# Patient Record
Sex: MALE | Race: WHITE | Employment: OTHER | URBAN - METROPOLITAN AREA
[De-identification: names, ages, dates, MRNs, and addresses within clinical notes are randomized per-mention and may not be internally consistent; named-entity substitution may affect disease eponyms.]

---

## 2022-01-25 ENCOUNTER — HOSPITAL ENCOUNTER (OUTPATIENT)
Dept: PULMONOLOGY | Age: 72
Setting detail: THERAPIES SERIES
Discharge: HOME | End: 2022-01-25
Payer: MEDICARE

## 2022-01-25 PROCEDURE — 94625 PHY/QHP OP PULM RHB W/O MNTR: CPT

## 2022-01-25 PROCEDURE — 94626 PHY/QHP OP PULM RHB W/MNTR: CPT

## 2022-01-27 ENCOUNTER — HOSPITAL ENCOUNTER (OUTPATIENT)
Dept: PULMONOLOGY | Age: 72
Setting detail: THERAPIES SERIES
Discharge: HOME | End: 2022-01-27
Payer: MEDICARE

## 2022-01-27 PROCEDURE — 94626 PHY/QHP OP PULM RHB W/MNTR: CPT

## 2022-01-27 PROCEDURE — 94625 PHY/QHP OP PULM RHB W/O MNTR: CPT

## 2022-02-01 ENCOUNTER — HOSPITAL ENCOUNTER (OUTPATIENT)
Dept: PULMONOLOGY | Age: 72
Setting detail: THERAPIES SERIES
Discharge: HOME | End: 2022-02-01
Payer: MEDICARE

## 2022-02-01 PROCEDURE — 94625 PHY/QHP OP PULM RHB W/O MNTR: CPT

## 2022-02-03 ENCOUNTER — APPOINTMENT (OUTPATIENT)
Dept: PULMONOLOGY | Age: 72
End: 2022-02-03
Payer: MEDICARE

## 2022-02-08 ENCOUNTER — HOSPITAL ENCOUNTER (OUTPATIENT)
Dept: PULMONOLOGY | Age: 72
Setting detail: THERAPIES SERIES
Discharge: HOME | End: 2022-02-08
Payer: MEDICARE

## 2022-02-08 PROCEDURE — 94625 PHY/QHP OP PULM RHB W/O MNTR: CPT

## 2022-02-10 ENCOUNTER — HOSPITAL ENCOUNTER (OUTPATIENT)
Dept: PULMONOLOGY | Age: 72
Setting detail: THERAPIES SERIES
Discharge: HOME | End: 2022-02-10
Payer: MEDICARE

## 2022-02-10 PROCEDURE — 94625 PHY/QHP OP PULM RHB W/O MNTR: CPT

## 2022-02-15 ENCOUNTER — HOSPITAL ENCOUNTER (OUTPATIENT)
Dept: PULMONOLOGY | Age: 72
Setting detail: THERAPIES SERIES
Discharge: HOME | End: 2022-02-15
Payer: MEDICARE

## 2022-02-15 PROCEDURE — 94625 PHY/QHP OP PULM RHB W/O MNTR: CPT

## 2022-02-17 ENCOUNTER — HOSPITAL ENCOUNTER (OUTPATIENT)
Dept: PULMONOLOGY | Age: 72
Setting detail: THERAPIES SERIES
Discharge: HOME | End: 2022-02-17
Payer: MEDICARE

## 2022-02-17 PROCEDURE — 94625 PHY/QHP OP PULM RHB W/O MNTR: CPT

## 2022-02-22 ENCOUNTER — HOSPITAL ENCOUNTER (OUTPATIENT)
Dept: PULMONOLOGY | Age: 72
Setting detail: THERAPIES SERIES
Discharge: HOME | End: 2022-02-22
Payer: MEDICARE

## 2022-02-22 PROCEDURE — 94625 PHY/QHP OP PULM RHB W/O MNTR: CPT

## 2022-02-24 ENCOUNTER — HOSPITAL ENCOUNTER (OUTPATIENT)
Dept: PULMONOLOGY | Age: 72
Setting detail: THERAPIES SERIES
Discharge: HOME | End: 2022-02-24
Payer: MEDICARE

## 2022-02-24 PROCEDURE — 94625 PHY/QHP OP PULM RHB W/O MNTR: CPT

## 2022-03-01 ENCOUNTER — HOSPITAL ENCOUNTER (OUTPATIENT)
Dept: PULMONOLOGY | Age: 72
Setting detail: THERAPIES SERIES
Discharge: HOME | End: 2022-03-01
Payer: MEDICARE

## 2022-03-01 PROCEDURE — 94625 PHY/QHP OP PULM RHB W/O MNTR: CPT

## 2022-03-03 ENCOUNTER — HOSPITAL ENCOUNTER (OUTPATIENT)
Dept: PULMONOLOGY | Age: 72
Discharge: HOME | End: 2022-03-03

## 2022-03-03 PROCEDURE — 94625 PHY/QHP OP PULM RHB W/O MNTR: CPT

## 2022-03-08 ENCOUNTER — HOSPITAL ENCOUNTER (OUTPATIENT)
Dept: PULMONOLOGY | Age: 72
Setting detail: THERAPIES SERIES
Discharge: HOME | End: 2022-03-08
Payer: MEDICARE

## 2022-03-08 PROCEDURE — 94625 PHY/QHP OP PULM RHB W/O MNTR: CPT

## 2022-03-10 ENCOUNTER — HOSPITAL ENCOUNTER (OUTPATIENT)
Dept: PULMONOLOGY | Age: 72
Setting detail: THERAPIES SERIES
Discharge: HOME | End: 2022-03-10
Payer: MEDICARE

## 2022-03-10 PROCEDURE — 94625 PHY/QHP OP PULM RHB W/O MNTR: CPT

## 2022-03-15 ENCOUNTER — HOSPITAL ENCOUNTER (OUTPATIENT)
Dept: PULMONOLOGY | Age: 72
Setting detail: THERAPIES SERIES
Discharge: HOME | End: 2022-03-15
Payer: MEDICARE

## 2022-03-15 PROCEDURE — 94625 PHY/QHP OP PULM RHB W/O MNTR: CPT

## 2022-03-17 ENCOUNTER — HOSPITAL ENCOUNTER (OUTPATIENT)
Dept: PULMONOLOGY | Age: 72
Setting detail: THERAPIES SERIES
Discharge: HOME | End: 2022-03-17
Payer: MEDICARE

## 2022-03-17 PROCEDURE — 94625 PHY/QHP OP PULM RHB W/O MNTR: CPT

## 2022-03-22 ENCOUNTER — HOSPITAL ENCOUNTER (OUTPATIENT)
Dept: PULMONOLOGY | Age: 72
Setting detail: THERAPIES SERIES
Discharge: HOME | End: 2022-03-22
Payer: MEDICARE

## 2022-03-22 PROCEDURE — 94625 PHY/QHP OP PULM RHB W/O MNTR: CPT

## 2022-03-24 ENCOUNTER — HOSPITAL ENCOUNTER (OUTPATIENT)
Dept: PULMONOLOGY | Age: 72
Setting detail: THERAPIES SERIES
Discharge: HOME | End: 2022-03-24
Payer: MEDICARE

## 2022-03-24 PROCEDURE — 94625 PHY/QHP OP PULM RHB W/O MNTR: CPT

## 2022-03-29 ENCOUNTER — HOSPITAL ENCOUNTER (OUTPATIENT)
Dept: PULMONOLOGY | Age: 72
Setting detail: THERAPIES SERIES
Discharge: HOME | End: 2022-03-29
Payer: MEDICARE

## 2022-03-29 PROCEDURE — 94625 PHY/QHP OP PULM RHB W/O MNTR: CPT

## 2022-03-31 ENCOUNTER — HOSPITAL ENCOUNTER (OUTPATIENT)
Dept: PULMONOLOGY | Age: 72
Setting detail: THERAPIES SERIES
Discharge: HOME | End: 2022-03-31
Payer: MEDICARE

## 2022-03-31 PROCEDURE — 94625 PHY/QHP OP PULM RHB W/O MNTR: CPT

## 2022-04-05 ENCOUNTER — HOSPITAL ENCOUNTER (OUTPATIENT)
Dept: PULMONOLOGY | Age: 72
Setting detail: THERAPIES SERIES
Discharge: HOME | End: 2022-04-05
Payer: MEDICARE

## 2022-04-05 PROCEDURE — 94625 PHY/QHP OP PULM RHB W/O MNTR: CPT

## 2022-04-07 ENCOUNTER — HOSPITAL ENCOUNTER (OUTPATIENT)
Dept: PULMONOLOGY | Age: 72
Setting detail: THERAPIES SERIES
Discharge: HOME | End: 2022-04-07
Payer: MEDICARE

## 2022-04-07 PROCEDURE — 94625 PHY/QHP OP PULM RHB W/O MNTR: CPT

## 2022-04-12 ENCOUNTER — HOSPITAL ENCOUNTER (OUTPATIENT)
Dept: PULMONOLOGY | Age: 72
Setting detail: THERAPIES SERIES
Discharge: HOME | End: 2022-04-12
Payer: MEDICARE

## 2022-04-12 PROCEDURE — 94625 PHY/QHP OP PULM RHB W/O MNTR: CPT

## 2022-04-14 ENCOUNTER — HOSPITAL ENCOUNTER (OUTPATIENT)
Dept: PULMONOLOGY | Age: 72
Setting detail: THERAPIES SERIES
Discharge: HOME | End: 2022-04-14
Payer: MEDICARE

## 2022-04-14 PROCEDURE — 94625 PHY/QHP OP PULM RHB W/O MNTR: CPT

## 2022-04-19 ENCOUNTER — HOSPITAL ENCOUNTER (OUTPATIENT)
Dept: PULMONOLOGY | Age: 72
Setting detail: THERAPIES SERIES
Discharge: HOME | End: 2022-04-19
Payer: MEDICARE

## 2022-04-19 PROCEDURE — 94625 PHY/QHP OP PULM RHB W/O MNTR: CPT

## 2022-04-21 ENCOUNTER — HOSPITAL ENCOUNTER (OUTPATIENT)
Dept: PULMONOLOGY | Age: 72
Setting detail: THERAPIES SERIES
Discharge: HOME | End: 2022-04-21
Payer: MEDICARE

## 2022-04-21 PROCEDURE — 94625 PHY/QHP OP PULM RHB W/O MNTR: CPT

## 2022-04-26 ENCOUNTER — HOSPITAL ENCOUNTER (OUTPATIENT)
Dept: PULMONOLOGY | Age: 72
Setting detail: THERAPIES SERIES
Discharge: HOME | End: 2022-04-26
Payer: MEDICARE

## 2022-04-26 PROCEDURE — 94625 PHY/QHP OP PULM RHB W/O MNTR: CPT

## 2022-04-28 ENCOUNTER — HOSPITAL ENCOUNTER (OUTPATIENT)
Dept: PULMONOLOGY | Age: 72
Setting detail: THERAPIES SERIES
Discharge: HOME | End: 2022-04-28
Payer: MEDICARE

## 2022-04-28 PROCEDURE — 94625 PHY/QHP OP PULM RHB W/O MNTR: CPT

## 2022-05-03 ENCOUNTER — HOSPITAL ENCOUNTER (OUTPATIENT)
Dept: PULMONOLOGY | Age: 72
Setting detail: THERAPIES SERIES
Discharge: HOME | End: 2022-05-03
Payer: MEDICARE

## 2022-05-03 PROCEDURE — 94625 PHY/QHP OP PULM RHB W/O MNTR: CPT

## 2022-05-05 ENCOUNTER — HOSPITAL ENCOUNTER (OUTPATIENT)
Dept: PULMONOLOGY | Age: 72
Setting detail: THERAPIES SERIES
Discharge: HOME | End: 2022-05-05
Payer: MEDICARE

## 2022-05-05 PROCEDURE — 94625 PHY/QHP OP PULM RHB W/O MNTR: CPT

## 2022-05-10 ENCOUNTER — HOSPITAL ENCOUNTER (OUTPATIENT)
Dept: PULMONOLOGY | Age: 72
Setting detail: THERAPIES SERIES
Discharge: HOME | End: 2022-05-10
Payer: MEDICARE

## 2022-05-10 PROCEDURE — 94625 PHY/QHP OP PULM RHB W/O MNTR: CPT

## 2022-05-12 ENCOUNTER — HOSPITAL ENCOUNTER (OUTPATIENT)
Dept: PULMONOLOGY | Age: 72
Setting detail: THERAPIES SERIES
Discharge: HOME | End: 2022-05-12
Payer: MEDICARE

## 2022-05-12 PROCEDURE — 94625 PHY/QHP OP PULM RHB W/O MNTR: CPT

## 2022-05-17 ENCOUNTER — HOSPITAL ENCOUNTER (OUTPATIENT)
Dept: PULMONOLOGY | Age: 72
Setting detail: THERAPIES SERIES
Discharge: HOME | End: 2022-05-17
Payer: MEDICARE

## 2022-05-17 PROCEDURE — 94625 PHY/QHP OP PULM RHB W/O MNTR: CPT

## 2022-05-19 ENCOUNTER — HOSPITAL ENCOUNTER (OUTPATIENT)
Dept: PULMONOLOGY | Age: 72
Setting detail: THERAPIES SERIES
Discharge: HOME | End: 2022-05-19
Payer: MEDICARE

## 2022-05-19 PROCEDURE — 94625 PHY/QHP OP PULM RHB W/O MNTR: CPT

## 2022-05-24 ENCOUNTER — APPOINTMENT (OUTPATIENT)
Dept: PULMONOLOGY | Age: 72
End: 2022-05-24
Payer: MEDICARE

## 2022-05-26 ENCOUNTER — HOSPITAL ENCOUNTER (OUTPATIENT)
Dept: PULMONOLOGY | Age: 72
Setting detail: THERAPIES SERIES
Discharge: HOME | End: 2022-05-26
Payer: MEDICARE

## 2022-05-26 PROCEDURE — 94625 PHY/QHP OP PULM RHB W/O MNTR: CPT

## 2022-05-31 ENCOUNTER — HOSPITAL ENCOUNTER (OUTPATIENT)
Dept: PULMONOLOGY | Age: 72
Setting detail: THERAPIES SERIES
Discharge: HOME | End: 2022-05-31

## 2022-05-31 PROCEDURE — 94625 PHY/QHP OP PULM RHB W/O MNTR: CPT

## 2022-06-02 ENCOUNTER — HOSPITAL ENCOUNTER (OUTPATIENT)
Dept: PULMONOLOGY | Age: 72
Setting detail: THERAPIES SERIES
Discharge: HOME | End: 2022-06-02
Payer: MEDICARE

## 2022-06-02 PROCEDURE — 94625 PHY/QHP OP PULM RHB W/O MNTR: CPT

## 2022-06-07 ENCOUNTER — APPOINTMENT (OUTPATIENT)
Dept: PULMONOLOGY | Age: 72
End: 2022-06-07
Payer: MEDICARE

## 2022-06-09 ENCOUNTER — APPOINTMENT (OUTPATIENT)
Dept: PULMONOLOGY | Age: 72
End: 2022-06-09
Payer: MEDICARE

## 2022-06-14 ENCOUNTER — APPOINTMENT (OUTPATIENT)
Dept: PULMONOLOGY | Age: 72
End: 2022-06-14
Payer: MEDICARE

## 2022-06-16 ENCOUNTER — APPOINTMENT (OUTPATIENT)
Dept: PULMONOLOGY | Age: 72
End: 2022-06-16
Payer: MEDICARE

## 2022-06-21 ENCOUNTER — APPOINTMENT (OUTPATIENT)
Dept: PULMONOLOGY | Age: 72
End: 2022-06-21
Payer: MEDICARE

## 2022-06-23 ENCOUNTER — APPOINTMENT (OUTPATIENT)
Dept: PULMONOLOGY | Age: 72
End: 2022-06-23
Payer: MEDICARE

## 2022-06-28 ENCOUNTER — APPOINTMENT (OUTPATIENT)
Dept: PULMONOLOGY | Age: 72
End: 2022-06-28
Payer: MEDICARE

## 2022-06-30 ENCOUNTER — APPOINTMENT (OUTPATIENT)
Dept: PULMONOLOGY | Age: 72
End: 2022-06-30
Payer: MEDICARE

## 2022-10-13 ENCOUNTER — APPOINTMENT (OUTPATIENT)
Dept: CT IMAGING | Age: 72
End: 2022-10-13
Payer: MEDICARE

## 2022-10-13 ENCOUNTER — HOSPITAL ENCOUNTER (EMERGENCY)
Age: 72
Discharge: HOME | End: 2022-10-13
Attending: EMERGENCY MEDICINE
Payer: MEDICARE

## 2022-10-13 VITALS
TEMPERATURE: 97.8 F | WEIGHT: 160 LBS | SYSTOLIC BLOOD PRESSURE: 146 MMHG | DIASTOLIC BLOOD PRESSURE: 79 MMHG | HEIGHT: 71 IN | OXYGEN SATURATION: 98 % | HEART RATE: 70 BPM | RESPIRATION RATE: 16 BRPM | BODY MASS INDEX: 22.4 KG/M2

## 2022-10-13 DIAGNOSIS — S00.83XA CONTUSION OF FACE, INITIAL ENCOUNTER: ICD-10-CM

## 2022-10-13 DIAGNOSIS — R26.81 GAIT INSTABILITY: ICD-10-CM
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
## 2022-10-13 PROCEDURE — 6370000000 HC RX 637 (ALT 250 FOR IP): Performed by: EMERGENCY MEDICINE

## 2022-10-13 PROCEDURE — 99284 EMERGENCY DEPT VISIT MOD MDM: CPT

## 2022-10-13 PROCEDURE — 6830039000 HC L3 TRAUMA ALERT

## 2022-10-13 PROCEDURE — 72125 CT NECK SPINE W/O DYE: CPT

## 2022-10-13 PROCEDURE — 70450 CT HEAD/BRAIN W/O DYE: CPT

## 2022-10-13 RX ORDER — ACETAMINOPHEN 325 MG/1
650 TABLET ORAL ONCE
Status: COMPLETED | OUTPATIENT
Start: 2022-10-13 | End: 2022-10-13

## 2022-10-13 RX ADMIN — ACETAMINOPHEN 650 MG: 325 TABLET ORAL at 19:48

## 2022-10-13 ASSESSMENT — PAIN SCALES - GENERAL
PAINLEVEL_OUTOF10: 3
PAINLEVEL_OUTOF10: 3

## 2022-10-13 ASSESSMENT — PAIN DESCRIPTION - DESCRIPTORS
DESCRIPTORS: ACHING
DESCRIPTORS: SORE

## 2022-10-13 ASSESSMENT — PAIN DESCRIPTION - PAIN TYPE
TYPE: ACUTE PAIN
TYPE: ACUTE PAIN

## 2022-10-13 ASSESSMENT — LIFESTYLE VARIABLES
HOW OFTEN DO YOU HAVE A DRINK CONTAINING ALCOHOL: 2-4 TIMES A MONTH
HOW MANY STANDARD DRINKS CONTAINING ALCOHOL DO YOU HAVE ON A TYPICAL DAY: 3 OR 4

## 2022-10-13 ASSESSMENT — PAIN DESCRIPTION - LOCATION
LOCATION: HEAD
LOCATION: EYE

## 2022-10-13 ASSESSMENT — PAIN - FUNCTIONAL ASSESSMENT
PAIN_FUNCTIONAL_ASSESSMENT: 0-10
PAIN_FUNCTIONAL_ASSESSMENT: 0-10

## 2022-10-13 ASSESSMENT — PAIN DESCRIPTION - ORIENTATION: ORIENTATION: RIGHT

## 2022-10-13 NOTE — ED TRIAGE NOTES
Patient tripped and fell, hitting his forehead on concrete. Confirms LOC. Denies blood thinners. Patient A&Ox4. VSS.

## 2022-10-14 NOTE — ED PROVIDER NOTES
[address] [facility] ED  EMERGENCY DEPARTMENT ENCOUNTER      Pt Name: Belinda Durham  MRN: [id number]  Douglasgfmattie 1950  Date of evaluation: 10/13/2022  Provider: Renetta Matthews MD    CHIEF COMPLAINT       Chief Complaint   Patient presents with    Fall     Fall from standing. +Head injury +LOC -thinners         HISTORY OF PRESENT ILLNESS   (Location/Symptom, Timing/Onset, Context/Setting, Quality, Duration, Modifying Factors, Severity)  Note limiting factors. 81-year-old male presenting after a fall. Patient tripped and fell. He is supposed to be using a walker but does not. Hit his head and briefly lost consciousness. Alert and oriented currently and notes only pain around the right eye. Has not taken anything for relief. No changes in vision. Denies any neck pain, numbness or tingling. No vomiting. Not on blood thinners. Nursing Notes were reviewed. REVIEW OF SYSTEMS    (2-9 systems for level 4, 10 or more for level 5)     Review of Systems   All other systems reviewed and are negative. Except as noted above the remainder of the review of systems was reviewed and negative. PAST MEDICAL HISTORY   History reviewed. No pertinent past medical history. SURGICAL HISTORY     History reviewed. No pertinent surgical history. CURRENT MEDICATIONS       Previous Medications    No medications on file       ALLERGIES     Patient has no known allergies. FAMILY HISTORY     History reviewed. No pertinent family history.        SOCIAL HISTORY       Social History     Socioeconomic History    Marital status:      Spouse name: None    Number of children: None    Years of education: None    Highest education level: None   Tobacco Use    Smoking status: Former     Types: Cigarettes    Smokeless tobacco: Never    Tobacco comments:     Quit 6mo ago   Vaping Use    Vaping Use: Never used   Substance and Sexual Activity    Alcohol use: Yes     Comment: Occassionally    Drug use: Never Sexual activity: Not Currently       SCREENINGS    Arabi Coma Scale  Eye Opening: Spontaneous  Best Verbal Response: Oriented  Best Motor Response: Obeys commands  Arabi Coma Scale Score: 15          PHYSICAL EXAM    (up to 7 for level 4, 8 or more for level 5)     ED Triage Vitals [10/13/22 1901]   BP Temp Temp Source Heart Rate Resp SpO2 Height Weight   (!) 153/77 97.8 °F (36.6 °C) Temporal 69 16 97 % 5' 11\" (1.803 m) 160 lb (72.6 kg)       Physical Exam  Vitals and nursing note reviewed. Constitutional:       General: He is not in acute distress. Appearance: Normal appearance. He is well-developed. He is not ill-appearing. HENT:      Head: Normocephalic and atraumatic. Mouth/Throat:      Mouth: Mucous membranes are moist.      Pharynx: Oropharynx is clear. Eyes:      Extraocular Movements: Extraocular movements intact. Conjunctiva/sclera: Conjunctivae normal.      Pupils: Pupils are equal, round, and reactive to light. Comments: R eye with contusion, normal vision   Cardiovascular:      Rate and Rhythm: Normal rate and regular rhythm. Pulmonary:      Effort: Pulmonary effort is normal.      Breath sounds: Normal breath sounds. Abdominal:      General: Bowel sounds are normal.      Palpations: Abdomen is soft. Tenderness: There is no abdominal tenderness. Musculoskeletal:         General: No deformity. Normal range of motion. Cervical back: Normal range of motion and neck supple. Skin:     General: Skin is warm and dry. Capillary Refill: Capillary refill takes less than 2 seconds. Neurological:      General: No focal deficit present. Mental Status: He is alert and oriented to person, place, and time. Mental status is at baseline. Cranial Nerves: No cranial nerve deficit. Psychiatric:         Thought Content:  Thought content normal.       DIAGNOSTIC RESULTS     EKG: All EKG's are interpreted by the Emergency Department Physician who either signs or Co-signs this chart in the absence of a cardiologist.    RADIOLOGY:   Non-plain film images such as CT, Ultrasound and MRI are read by the radiologist. Plain radiographic images are visualized and preliminarily interpreted by the emergency physician with the below findings:    Interpretation per the Radiologist below, if available at the time of this note:    CT Head WO Contrast   Final Result   No acute intracranial abnormality. Right frontal  shunt catheter. No evidence hydrocephalus. Recommend   comparison to prior imaging if available. CT CERVICAL SPINE WO CONTRAST   Final Result   No acute abnormality of the cervical spine. LABS:  Labs Reviewed - No data to display    All other labs were within normal range or not returned as of this dictation. EMERGENCY DEPARTMENT COURSE and DIFFERENTIAL DIAGNOSIS/MDM:   Vitals:    Vitals:    10/13/22 1901   BP: (!) 153/77   Pulse: 69   Resp: 16   Temp: 97.8 °F (36.6 °C)   TempSrc: Temporal   SpO2: 97%   Weight: 160 lb (72.6 kg)   Height: 5' 11\" (1.803 m)       MDM  Number of Diagnoses or Management Options  Contusion of face, initial encounter  Fall, initial encounter  Gait instability  Diagnosis management comments: CT scans are unremarkable. Patient without complaints during ED course. Patient will be discharged home in good condition. Patient has been hemodynamically stable throughout ED course and is appropriate for outpatient follow up. Patient should follow up with PCP in 2-3 days or return to ED immediately for any new or worsening symptoms. Patient is well appearing on discharge and agreeable with plan of care. Procedures    CRITICAL CARE TIME   Total Critical Care time was 0 minutes, excluding separately reportable procedures. There was a high probability of clinically significant/life threatening deterioration in the patient's condition which required my urgent intervention. FINAL IMPRESSION      1.  Fall, initial encounter    2. Contusion of face, initial encounter    3.  Gait instability          DISPOSITION/PLAN   DISPOSITION Decision To Discharge 10/13/2022 09:04:28 PM      (Please note that portions of this note were completed with a voice recognition program.  Efforts were made to edit the dictations but occasionally words are mis-transcribed.)    Coit Lesch, MD (electronically signed)  Attending Emergency Physician        Coit Lesch, MD  10/13/22 5843

## 2022-10-14 NOTE — ED NOTES
Patient ambulated to restroom with assistance from this RN. Very unsteady gait noted. Patient states \"I just can't control that leg all the time. \"     Alina Estrada RN  10/13/22 2017

## 2022-10-14 NOTE — ED NOTES
Patients wife at bedside. States patient has been told multiple times, by doctors and physical therapists to use a walker when ambulating at all times.  Wife reports patient refuses to use walker     Will Tanner RN  10/13/22 2017

## 2024-08-16 ENCOUNTER — APPOINTMENT (OUTPATIENT)
Dept: GENERAL RADIOLOGY | Age: 74
End: 2024-08-16
Payer: MEDICARE

## 2024-08-16 ENCOUNTER — APPOINTMENT (OUTPATIENT)
Dept: CT IMAGING | Age: 74
End: 2024-08-16
Payer: MEDICARE

## 2024-08-16 ENCOUNTER — HOSPITAL ENCOUNTER (INPATIENT)
Age: 74
LOS: 7 days | Discharge: NURSING, REHAB OR LONG-TERM CARE | End: 2024-08-23
Attending: STUDENT IN AN ORGANIZED HEALTH CARE EDUCATION/TRAINING PROGRAM | Admitting: INTERNAL MEDICINE
Payer: MEDICARE

## 2024-08-16 DIAGNOSIS — J44.9 CHRONIC OBSTRUCTIVE PULMONARY DISEASE, UNSPECIFIED COPD TYPE (HCC): Primary | ICD-10-CM

## 2024-08-16 DIAGNOSIS — R79.89 ELEVATED TROPONIN: ICD-10-CM

## 2024-08-16 DIAGNOSIS — R06.02 SHORTNESS OF BREATH: ICD-10-CM

## 2024-08-16 LAB
ANION GAP SERPL CALCULATED.3IONS-SCNC: 11 MEQ/L (ref 9–15)
B PARAP IS1001 DNA NPH QL NAA+NON-PROBE: NOT DETECTED
B PERT.PT PRMT NPH QL NAA+NON-PROBE: NOT DETECTED
BASE EXCESS ARTERIAL: 5 (ref -3–3)
BASOPHILS # BLD: 0 K/UL (ref 0–0.2)
BASOPHILS NFR BLD: 0.4 %
BNP BLD-MCNC: 1728 PG/ML
BUN SERPL-MCNC: 24 MG/DL (ref 8–23)
C PNEUM DNA NPH QL NAA+NON-PROBE: NOT DETECTED
CALCIUM IONIZED: 1.25 MMOL/L (ref 1.12–1.32)
CALCIUM SERPL-MCNC: 9.7 MG/DL (ref 8.5–9.9)
CHLORIDE SERPL-SCNC: 101 MEQ/L (ref 95–107)
CO2 SERPL-SCNC: 28 MEQ/L (ref 20–31)
CREAT SERPL-MCNC: 0.85 MG/DL (ref 0.7–1.2)
EOSINOPHIL # BLD: 0 K/UL (ref 0–0.7)
EOSINOPHIL NFR BLD: 0.3 %
ERYTHROCYTE [DISTWIDTH] IN BLOOD BY AUTOMATED COUNT: 14.6 % (ref 11.5–14.5)
FLUAV RNA NPH QL NAA+NON-PROBE: NOT DETECTED
FLUBV RNA NPH QL NAA+NON-PROBE: NOT DETECTED
GLUCOSE BLD-MCNC: 102 MG/DL (ref 70–99)
GLUCOSE SERPL-MCNC: 128 MG/DL (ref 70–99)
HADV DNA NPH QL NAA+NON-PROBE: NOT DETECTED
HCO3 ARTERIAL: 29.4 MMOL/L (ref 21–29)
HCOV 229E RNA NPH QL NAA+NON-PROBE: NOT DETECTED
HCOV HKU1 RNA NPH QL NAA+NON-PROBE: NOT DETECTED
HCOV NL63 RNA NPH QL NAA+NON-PROBE: NOT DETECTED
HCOV OC43 RNA NPH QL NAA+NON-PROBE: NOT DETECTED
HCT VFR BLD AUTO: 35.3 % (ref 42–52)
HCT VFR BLD AUTO: 37 % (ref 41–53)
HGB BLD CALC-MCNC: 12.4 GM/DL (ref 13.5–17.5)
HGB BLD-MCNC: 12.1 G/DL (ref 14–18)
HMPV RNA NPH QL NAA+NON-PROBE: NOT DETECTED
HPIV1 RNA NPH QL NAA+NON-PROBE: NOT DETECTED
HPIV2 RNA NPH QL NAA+NON-PROBE: NOT DETECTED
HPIV3 RNA NPH QL NAA+NON-PROBE: NOT DETECTED
HPIV4 RNA NPH QL NAA+NON-PROBE: NOT DETECTED
LACTATE: 0.56 MMOL/L (ref 0.4–2)
LYMPHOCYTES # BLD: 0.6 K/UL (ref 1–4.8)
LYMPHOCYTES NFR BLD: 6 %
M PNEUMO DNA NPH QL NAA+NON-PROBE: NOT DETECTED
MAGNESIUM SERPL-MCNC: 2.4 MG/DL (ref 1.7–2.4)
MCH RBC QN AUTO: 32.4 PG (ref 27–31.3)
MCHC RBC AUTO-ENTMCNC: 34.3 % (ref 33–37)
MCV RBC AUTO: 94.4 FL (ref 79–92.2)
MONOCYTES # BLD: 1.3 K/UL (ref 0.2–0.8)
MONOCYTES NFR BLD: 14.3 %
NEUTROPHILS # BLD: 7.3 K/UL (ref 1.4–6.5)
NEUTS SEG NFR BLD: 78.5 %
O2 SAT, ARTERIAL: 97 % (ref 93–100)
PCO2 ARTERIAL: 48 MM HG (ref 35–45)
PERFORMED ON: ABNORMAL
PH ARTERIAL: 7.39 (ref 7.35–7.45)
PLATELET # BLD AUTO: 277 K/UL (ref 130–400)
PO2 ARTERIAL: 95 MM HG (ref 75–108)
POC CHLORIDE: 103 MEQ/L (ref 99–110)
POC CREATININE: 0.8 MG/DL (ref 0.8–1.3)
POC FIO2: 3
POC SAMPLE TYPE: ABNORMAL
POTASSIUM SERPL-SCNC: 3.9 MEQ/L (ref 3.5–5.1)
POTASSIUM SERPL-SCNC: 4.1 MEQ/L (ref 3.4–4.9)
RBC # BLD AUTO: 3.74 M/UL (ref 4.7–6.1)
RSV RNA NPH QL NAA+NON-PROBE: NOT DETECTED
RV+EV RNA NPH QL NAA+NON-PROBE: NOT DETECTED
SARS-COV-2 RNA NPH QL NAA+NON-PROBE: NOT DETECTED
SODIUM BLD-SCNC: 142 MEQ/L (ref 136–145)
SODIUM SERPL-SCNC: 140 MEQ/L (ref 135–144)
TCO2 ARTERIAL: 31 MMOL/L (ref 21–32)
TROPONIN, HIGH SENSITIVITY: 100 NG/L (ref 0–19)
TROPONIN, HIGH SENSITIVITY: 87 NG/L (ref 0–19)
WBC # BLD AUTO: 9.4 K/UL (ref 4.8–10.8)

## 2024-08-16 PROCEDURE — 94640 AIRWAY INHALATION TREATMENT: CPT

## 2024-08-16 PROCEDURE — 36600 WITHDRAWAL OF ARTERIAL BLOOD: CPT

## 2024-08-16 PROCEDURE — 6370000000 HC RX 637 (ALT 250 FOR IP): Performed by: INTERNAL MEDICINE

## 2024-08-16 PROCEDURE — 94760 N-INVAS EAR/PLS OXIMETRY 1: CPT

## 2024-08-16 PROCEDURE — 87185 SC STD ENZYME DETCJ PER NZM: CPT

## 2024-08-16 PROCEDURE — 80048 BASIC METABOLIC PNL TOTAL CA: CPT

## 2024-08-16 PROCEDURE — 0202U NFCT DS 22 TRGT SARS-COV-2: CPT

## 2024-08-16 PROCEDURE — 2580000003 HC RX 258: Performed by: INTERNAL MEDICINE

## 2024-08-16 PROCEDURE — 6370000000 HC RX 637 (ALT 250 FOR IP): Performed by: STUDENT IN AN ORGANIZED HEALTH CARE EDUCATION/TRAINING PROGRAM

## 2024-08-16 PROCEDURE — 6360000002 HC RX W HCPCS: Performed by: INTERNAL MEDICINE

## 2024-08-16 PROCEDURE — 71045 X-RAY EXAM CHEST 1 VIEW: CPT

## 2024-08-16 PROCEDURE — 84295 ASSAY OF SERUM SODIUM: CPT

## 2024-08-16 PROCEDURE — 6830039000 HC L3 TRAUMA ALERT

## 2024-08-16 PROCEDURE — 99285 EMERGENCY DEPT VISIT HI MDM: CPT

## 2024-08-16 PROCEDURE — 2580000003 HC RX 258: Performed by: STUDENT IN AN ORGANIZED HEALTH CARE EDUCATION/TRAINING PROGRAM

## 2024-08-16 PROCEDURE — 2700000000 HC OXYGEN THERAPY PER DAY

## 2024-08-16 PROCEDURE — 83880 ASSAY OF NATRIURETIC PEPTIDE: CPT

## 2024-08-16 PROCEDURE — 87077 CULTURE AEROBIC IDENTIFY: CPT

## 2024-08-16 PROCEDURE — 82565 ASSAY OF CREATININE: CPT

## 2024-08-16 PROCEDURE — 71250 CT THORAX DX C-: CPT

## 2024-08-16 PROCEDURE — 84132 ASSAY OF SERUM POTASSIUM: CPT

## 2024-08-16 PROCEDURE — 6360000002 HC RX W HCPCS: Performed by: STUDENT IN AN ORGANIZED HEALTH CARE EDUCATION/TRAINING PROGRAM

## 2024-08-16 PROCEDURE — 82435 ASSAY OF BLOOD CHLORIDE: CPT

## 2024-08-16 PROCEDURE — 85014 HEMATOCRIT: CPT

## 2024-08-16 PROCEDURE — 87070 CULTURE OTHR SPECIMN AEROBIC: CPT

## 2024-08-16 PROCEDURE — 93005 ELECTROCARDIOGRAM TRACING: CPT | Performed by: STUDENT IN AN ORGANIZED HEALTH CARE EDUCATION/TRAINING PROGRAM

## 2024-08-16 PROCEDURE — 83735 ASSAY OF MAGNESIUM: CPT

## 2024-08-16 PROCEDURE — 82803 BLOOD GASES ANY COMBINATION: CPT

## 2024-08-16 PROCEDURE — 85025 COMPLETE CBC W/AUTO DIFF WBC: CPT

## 2024-08-16 PROCEDURE — 83605 ASSAY OF LACTIC ACID: CPT

## 2024-08-16 PROCEDURE — 82330 ASSAY OF CALCIUM: CPT

## 2024-08-16 PROCEDURE — 94761 N-INVAS EAR/PLS OXIMETRY MLT: CPT

## 2024-08-16 PROCEDURE — 96374 THER/PROPH/DIAG INJ IV PUSH: CPT

## 2024-08-16 PROCEDURE — 1210000000 HC MED SURG R&B

## 2024-08-16 PROCEDURE — 84484 ASSAY OF TROPONIN QUANT: CPT

## 2024-08-16 RX ORDER — SODIUM CHLORIDE 0.9 % (FLUSH) 0.9 %
5-40 SYRINGE (ML) INJECTION EVERY 12 HOURS SCHEDULED
Status: DISCONTINUED | OUTPATIENT
Start: 2024-08-16 | End: 2024-08-23 | Stop reason: HOSPADM

## 2024-08-16 RX ORDER — IPRATROPIUM BROMIDE AND ALBUTEROL SULFATE 2.5; .5 MG/3ML; MG/3ML
1 SOLUTION RESPIRATORY (INHALATION) EVERY 4 HOURS PRN
Status: DISCONTINUED | OUTPATIENT
Start: 2024-08-16 | End: 2024-08-23 | Stop reason: HOSPADM

## 2024-08-16 RX ORDER — SODIUM CHLORIDE 0.9 % (FLUSH) 0.9 %
5-40 SYRINGE (ML) INJECTION PRN
Status: DISCONTINUED | OUTPATIENT
Start: 2024-08-16 | End: 2024-08-23 | Stop reason: HOSPADM

## 2024-08-16 RX ORDER — BUPROPION HYDROCHLORIDE 100 MG/1
100 TABLET, EXTENDED RELEASE ORAL 2 TIMES DAILY
COMMUNITY
Start: 2024-08-05

## 2024-08-16 RX ORDER — GUAIFENESIN/DEXTROMETHORPHAN 100-10MG/5
5 SYRUP ORAL EVERY 4 HOURS PRN
Status: DISCONTINUED | OUTPATIENT
Start: 2024-08-16 | End: 2024-08-23 | Stop reason: HOSPADM

## 2024-08-16 RX ORDER — ARFORMOTEROL TARTRATE 15 UG/2ML
15 SOLUTION RESPIRATORY (INHALATION) EVERY 12 HOURS
COMMUNITY
Start: 2024-08-08 | End: 2025-02-04

## 2024-08-16 RX ORDER — SODIUM CHLORIDE 9 MG/ML
INJECTION, SOLUTION INTRAVENOUS PRN
Status: DISCONTINUED | OUTPATIENT
Start: 2024-08-16 | End: 2024-08-23 | Stop reason: HOSPADM

## 2024-08-16 RX ORDER — DICYCLOMINE HYDROCHLORIDE 10 MG/1
10 CAPSULE ORAL 3 TIMES DAILY
COMMUNITY
Start: 2007-03-15

## 2024-08-16 RX ORDER — ACETAMINOPHEN 650 MG/1
650 SUPPOSITORY RECTAL EVERY 6 HOURS PRN
Status: DISCONTINUED | OUTPATIENT
Start: 2024-08-16 | End: 2024-08-23 | Stop reason: HOSPADM

## 2024-08-16 RX ORDER — IPRATROPIUM BROMIDE AND ALBUTEROL SULFATE 2.5; .5 MG/3ML; MG/3ML
1 SOLUTION RESPIRATORY (INHALATION)
Status: DISCONTINUED | OUTPATIENT
Start: 2024-08-17 | End: 2024-08-23 | Stop reason: HOSPADM

## 2024-08-16 RX ORDER — ACETAMINOPHEN 325 MG/1
650 TABLET ORAL EVERY 6 HOURS PRN
Status: DISCONTINUED | OUTPATIENT
Start: 2024-08-16 | End: 2024-08-23 | Stop reason: HOSPADM

## 2024-08-16 RX ORDER — POLYETHYLENE GLYCOL 3350 17 G/17G
17 POWDER, FOR SOLUTION ORAL DAILY PRN
Status: DISCONTINUED | OUTPATIENT
Start: 2024-08-16 | End: 2024-08-23 | Stop reason: HOSPADM

## 2024-08-16 RX ORDER — IPRATROPIUM BROMIDE AND ALBUTEROL SULFATE 2.5; .5 MG/3ML; MG/3ML
1 SOLUTION RESPIRATORY (INHALATION)
Status: DISCONTINUED | OUTPATIENT
Start: 2024-08-16 | End: 2024-08-16

## 2024-08-16 RX ORDER — IPRATROPIUM BROMIDE AND ALBUTEROL SULFATE 2.5; .5 MG/3ML; MG/3ML
1 SOLUTION RESPIRATORY (INHALATION)
Status: COMPLETED | OUTPATIENT
Start: 2024-08-16 | End: 2024-08-16

## 2024-08-16 RX ORDER — SUCRALFATE 1 G/1
1 TABLET ORAL 3 TIMES DAILY
COMMUNITY
Start: 2024-04-23

## 2024-08-16 RX ORDER — SODIUM CHLORIDE FOR INHALATION 3 %
4 VIAL, NEBULIZER (ML) INHALATION 2 TIMES DAILY
Status: ON HOLD | COMMUNITY
Start: 2024-06-03 | End: 2024-08-16

## 2024-08-16 RX ORDER — ONDANSETRON 2 MG/ML
4 INJECTION INTRAMUSCULAR; INTRAVENOUS EVERY 6 HOURS PRN
Status: DISCONTINUED | OUTPATIENT
Start: 2024-08-16 | End: 2024-08-23 | Stop reason: HOSPADM

## 2024-08-16 RX ORDER — PREDNISONE 20 MG/1
50 TABLET ORAL ONCE
Status: COMPLETED | OUTPATIENT
Start: 2024-08-16 | End: 2024-08-16

## 2024-08-16 RX ORDER — ENOXAPARIN SODIUM 100 MG/ML
40 INJECTION SUBCUTANEOUS DAILY
Status: DISCONTINUED | OUTPATIENT
Start: 2024-08-16 | End: 2024-08-23 | Stop reason: HOSPADM

## 2024-08-16 RX ORDER — PSEUDOEPHEDRINE HCL 30 MG
100 TABLET ORAL 2 TIMES DAILY
COMMUNITY
Start: 2023-10-29

## 2024-08-16 RX ORDER — ONDANSETRON 4 MG/1
4 TABLET, ORALLY DISINTEGRATING ORAL EVERY 8 HOURS PRN
Status: DISCONTINUED | OUTPATIENT
Start: 2024-08-16 | End: 2024-08-23 | Stop reason: HOSPADM

## 2024-08-16 RX ORDER — MIRTAZAPINE 15 MG/1
15 TABLET, FILM COATED ORAL NIGHTLY
COMMUNITY
Start: 2024-07-25

## 2024-08-16 RX ORDER — MEMANTINE HYDROCHLORIDE 10 MG/1
10 TABLET ORAL DAILY
Status: ON HOLD | COMMUNITY
Start: 2024-07-25 | End: 2024-08-16

## 2024-08-16 RX ORDER — ASPIRIN 81 MG/1
324 TABLET, CHEWABLE ORAL ONCE
Status: COMPLETED | OUTPATIENT
Start: 2024-08-16 | End: 2024-08-16

## 2024-08-16 RX ADMIN — IPRATROPIUM BROMIDE AND ALBUTEROL SULFATE 1 DOSE: .5; 2.5 SOLUTION RESPIRATORY (INHALATION) at 20:32

## 2024-08-16 RX ADMIN — ENOXAPARIN SODIUM 40 MG: 100 INJECTION SUBCUTANEOUS at 21:02

## 2024-08-16 RX ADMIN — IPRATROPIUM BROMIDE AND ALBUTEROL SULFATE 1 DOSE: .5; 2.5 SOLUTION RESPIRATORY (INHALATION) at 14:41

## 2024-08-16 RX ADMIN — PREDNISONE 50 MG: 20 TABLET ORAL at 14:50

## 2024-08-16 RX ADMIN — AZITHROMYCIN DIHYDRATE 500 MG: 500 INJECTION, POWDER, LYOPHILIZED, FOR SOLUTION INTRAVENOUS at 14:50

## 2024-08-16 RX ADMIN — ASPIRIN 324 MG: 81 TABLET, CHEWABLE ORAL at 16:02

## 2024-08-16 RX ADMIN — Medication 5 ML: at 21:05

## 2024-08-16 RX ADMIN — GUAIFENESIN SYRUP AND DEXTROMETHORPHAN 5 ML: 100; 10 SYRUP ORAL at 21:03

## 2024-08-16 ASSESSMENT — PAIN - FUNCTIONAL ASSESSMENT: PAIN_FUNCTIONAL_ASSESSMENT: NONE - DENIES PAIN

## 2024-08-16 ASSESSMENT — LIFESTYLE VARIABLES
HOW MANY STANDARD DRINKS CONTAINING ALCOHOL DO YOU HAVE ON A TYPICAL DAY: PATIENT DOES NOT DRINK
HOW OFTEN DO YOU HAVE A DRINK CONTAINING ALCOHOL: NEVER

## 2024-08-16 NOTE — ED TRIAGE NOTES
Pt c/o freaquent falls due to weakness. Pt denies pain, denies injury, denies LOC, denies blood thinners. Pt is A&Ox4, skin intact, afebrile, breaths are equal and unlabored.

## 2024-08-16 NOTE — PROGRESS NOTES
1730 Patient arrived to unit via cart in stable condition with daughter Jordan at bedside, who is POA. Daughter instructed to bring in a copy of paperwork when able. Patient and daughter reports that patient has been having an increase in frequent falls, up to 4 in the past 2 days. Also reports an increase in SOB and productive cough. All admission questions answered appropriately. Oriented to room and educated on call light use      1815 Upon skin assessment, patient noted with old bandages to left arm. Patient states it is from a previous skin tear earlier in the week around Monday. Home health had placed band aids to the wound and had not changed it since for fear of tearing the skin even more. Bandages removed and dressing reapplied with pleuro gel, nonadherent pads, and curlex. Wound consulted     Electronically signed by Roxanne Mckeon RN on 8/16/2024 at 5:44 PM

## 2024-08-16 NOTE — ED PROVIDER NOTES
[facility] ED  EMERGENCY DEPARTMENT ENCOUNTER      Pt Name: Claude Xie  MRN: [id number]  Birthdate 1950  Date of evaluation: 8/16/2024  Provider: Rocky Puckett MD  1:58 PM    CHIEF COMPLAINT       Chief Complaint   Patient presents with    Fatigue     Frequent falls         HISTORY OF PRESENT ILLNESS    Claude Xie is a 74 y.o. male who presents to the emergency department shortness of breath, coughing, generalized weakness, difficulty ambulating    HPI  Patient is a 74-year-old male presenting to ED due to episode of hypoxia down to 82% and frequent falls.  Patient presents with his daughter.  Patient, lives alone.  Per the daughter, patient has fallen a total of 4 times within the past day.  Patient denies any head or neck injuries.  He denies any injuries to either his upper or lower extremities or upper or lower back injuries.  He endorses frequent coughing up of yellow-green sputum but no blood.  He denies any chest pain or palpitations.  He denies any shortness of breath while at rest.  Denies any wheezing.  He denies any fevers, chills, joint pains or muscle aches.  He endorses overall just feeling weak.  Patient's daughter was concerned about patient's fall risk and was interested in patient getting physical therapy at a facility.  He does currently get physical therapy at a facility outpatient wise once a week with his next appointment on 8/28.  She also endorsed that he is been for the past few days stopping to catch his breath and is just appeared overall short of breath with ambulation despite stating that is not short of breath at rest.  Patient was half a pack a day smoker.  Smoking about 2 years ago.  He has never been formally diagnosed with COPD.    Nursing Notes were reviewed.    REVIEW OF SYSTEMS       Review of Systems   Constitutional:  Positive for fatigue. Negative for activity change, appetite change, chills, diaphoresis and fever.   HENT:  Negative for congestion,  limits.  His white blood cell count was not significantly elevated.    Patient was agreeable to being admitted to the hospital for further treatment of his COPD exacerbation and hypoxia requiring slight O2 support along with further cardiac evaluation and workup.  Hospitalist team was contacted and we appreciate their assistance in admitting patient for further management.  Patient was stable upon admission to the hospital otherwise.        REASSESSMENT          CRITICAL CARE TIME   Total Critical Care time was 0 minutes, excluding separately reportable procedures.      CONSULTS:  None    PROCEDURES:  Unless otherwise noted below, none     Procedures        FINAL IMPRESSION    No diagnosis found.      DISPOSITION/PLAN   DISPOSITION    Condition at Disposition: Data Unavailable      PATIENT REFERRED TO:  No follow-up provider specified.    DISCHARGE MEDICATIONS:  New Prescriptions    No medications on file     Controlled Substances Monitoring:          No data to display                (Please note that portions of this note were completed with a voice recognition program.  Efforts were made to edit the dictations but occasionally words are mis-transcribed.)    Rocky Puckett MD (electronically signed)  Attending Emergency Physician          Rocky Puckett MD  08/17/24 [id number]       Rocky Puckett MD  08/17/24 0091

## 2024-08-16 NOTE — H&P
Hospital Medicine  History and Physical    Patient:  Claude Xie  MRN: [id number]    CHIEF COMPLAINT:    Chief Complaint   Patient presents with    Fatigue     Frequent falls       History Obtained From:  Patient, EMR  Primary Care Physician: Sheila Hernandez MD    HISTORY OF PRESENT ILLNESS:   The patient is a 74 y.o. male with PMH of severe COPD,Mycobacterium Xenopi cavitary lung disease,  NPH s/p  shunt, dementia, depression, who presented with the above CC. Patient has been experiencing progressively worsening of his chronic productive cough for the last 7-10 days associated with shortness of breath, weakness and multiple falls at home, was hypoxic with SPO2 in the 80s on arrival per report, placed on 3 liters of O2, ABG showed mild hypercapnic respiratory failure, ECG showed SR with no acute ischemic changes per report, CXR showed COPD, troponins were elevated, respiratory panel was negative, ASA, Zithromax, Duonebs and Prednisone were given, admitted for further management.    Past Medical History:  COPD  Mycobacterium Xenopi cavitary lung disease  NPH s/p  shunt  Dementia  Depression    Past Surgical History:  History reviewed. No pertinent surgical history.    Medications Prior to Admission:    Prior to Admission medications    Medication Sig Start Date End Date Taking? Authorizing Provider   sucralfate (CARAFATE) 1 GM tablet Take 1 tablet by mouth 3 times daily 4/23/24  Yes Sid Mccain MD   sodium chloride, Inhalant, 3 % nebulizer solution Inhale 4 mLs into the lungs 2 times daily 6/3/24  Yes ProviderSid MD   Multiple Vitamins-Minerals (ONE-A-DAY 50 PLUS PO) Take 1 tablet by mouth daily (with breakfast) 1/1/21  Yes Sid Mccain MD   mirtazapine (REMERON) 15 MG tablet Take 1 tablet by mouth nightly 7/25/24  Yes Sid Mccain MD   docusate (COLACE, DULCOLAX) 100 MG CAPS Take 100 mg by mouth 2 times daily 10/29/23  Yes Sid Mcacin MD   memantine

## 2024-08-17 ENCOUNTER — APPOINTMENT (OUTPATIENT)
Age: 74
End: 2024-08-17
Attending: INTERNAL MEDICINE
Payer: MEDICARE

## 2024-08-17 PROBLEM — J44.1 COPD EXACERBATION (HCC): Status: ACTIVE | Noted: 2024-08-17

## 2024-08-17 LAB
ALBUMIN SERPL-MCNC: 3.7 G/DL (ref 3.5–4.6)
ANION GAP SERPL CALCULATED.3IONS-SCNC: 9 MEQ/L (ref 9–15)
BASOPHILS # BLD: 0 K/UL (ref 0–0.2)
BASOPHILS NFR BLD: 0.1 %
BUN SERPL-MCNC: 24 MG/DL (ref 8–23)
CALCIUM SERPL-MCNC: 9 MG/DL (ref 8.5–9.9)
CHLORIDE SERPL-SCNC: 105 MEQ/L (ref 95–107)
CO2 SERPL-SCNC: 28 MEQ/L (ref 20–31)
CREAT SERPL-MCNC: 0.79 MG/DL (ref 0.7–1.2)
CRP SERPL HS-MCNC: 232.2 MG/L (ref 0–5)
ECHO AO ROOT DIAM: 3.4 CM
ECHO AO ROOT INDEX: 1.77 CM/M2
ECHO AV AREA PEAK VELOCITY: 2.1 CM2
ECHO AV AREA VTI: 1.9 CM2
ECHO AV AREA/BSA PEAK VELOCITY: 1.1 CM2/M2
ECHO AV AREA/BSA VTI: 1 CM2/M2
ECHO AV CUSP MM: 1.5 CM
ECHO AV MEAN GRADIENT: 5 MMHG
ECHO AV MEAN VELOCITY: 1 M/S
ECHO AV PEAK GRADIENT: 10 MMHG
ECHO AV PEAK VELOCITY: 1.6 M/S
ECHO AV VELOCITY RATIO: 0.81
ECHO AV VTI: 30.6 CM
ECHO BSA: 1.91 M2
ECHO LA DIAMETER INDEX: 1.35 CM/M2
ECHO LA DIAMETER: 2.6 CM
ECHO LA TO AORTIC ROOT RATIO: 0.76
ECHO LA VOL A-L A2C: 45 ML (ref 18–58)
ECHO LA VOL A-L A4C: 24 ML (ref 18–58)
ECHO LA VOL MOD A2C: 44 ML (ref 18–58)
ECHO LA VOL MOD A4C: 23 ML (ref 18–58)
ECHO LA VOLUME AREA LENGTH: 38 ML
ECHO LA VOLUME INDEX A-L A2C: 23 ML/M2 (ref 16–34)
ECHO LA VOLUME INDEX A-L A4C: 13 ML/M2 (ref 16–34)
ECHO LA VOLUME INDEX AREA LENGTH: 20 ML/M2 (ref 16–34)
ECHO LA VOLUME INDEX MOD A2C: 23 ML/M2 (ref 16–34)
ECHO LA VOLUME INDEX MOD A4C: 12 ML/M2 (ref 16–34)
ECHO LV E' LATERAL VELOCITY: 13 CM/S
ECHO LV E' SEPTAL VELOCITY: 10 CM/S
ECHO LV EDV A2C: 66 ML
ECHO LV EDV A4C: 62 ML
ECHO LV EDV BP: 65 ML (ref 67–155)
ECHO LV EDV INDEX A4C: 32 ML/M2
ECHO LV EDV INDEX BP: 34 ML/M2
ECHO LV EDV NDEX A2C: 34 ML/M2
ECHO LV EJECTION FRACTION A2C: 52 %
ECHO LV EJECTION FRACTION A4C: 73 %
ECHO LV EJECTION FRACTION BIPLANE: 64 % (ref 55–100)
ECHO LV ESV A2C: 32 ML
ECHO LV ESV A4C: 17 ML
ECHO LV ESV BP: 23 ML (ref 22–58)
ECHO LV ESV INDEX A2C: 17 ML/M2
ECHO LV ESV INDEX A4C: 9 ML/M2
ECHO LV ESV INDEX BP: 12 ML/M2
ECHO LV FRACTIONAL SHORTENING: 31 % (ref 28–44)
ECHO LV INTERNAL DIMENSION DIASTOLE INDEX: 2.03 CM/M2
ECHO LV INTERNAL DIMENSION DIASTOLIC: 3.9 CM (ref 4.2–5.9)
ECHO LV INTERNAL DIMENSION SYSTOLIC INDEX: 1.41 CM/M2
ECHO LV INTERNAL DIMENSION SYSTOLIC: 2.7 CM
ECHO LV IVSD: 0.9 CM (ref 0.6–1)
ECHO LV IVSS: 0.8 CM
ECHO LV MASS 2D: 82.3 G (ref 88–224)
ECHO LV MASS INDEX 2D: 42.8 G/M2 (ref 49–115)
ECHO LV POSTERIOR WALL DIASTOLIC: 0.6 CM (ref 0.6–1)
ECHO LV POSTERIOR WALL SYSTOLIC: 1.3 CM
ECHO LV RELATIVE WALL THICKNESS RATIO: 0.31
ECHO LVOT AREA: 2.5 CM2
ECHO LVOT AV VTI INDEX: 0.76
ECHO LVOT DIAM: 1.8 CM
ECHO LVOT MEAN GRADIENT: 3 MMHG
ECHO LVOT PEAK GRADIENT: 6 MMHG
ECHO LVOT PEAK VELOCITY: 1.3 M/S
ECHO LVOT STROKE VOLUME INDEX: 30.9 ML/M2
ECHO LVOT SV: 59.3 ML
ECHO LVOT VTI: 23.3 CM
ECHO MV A VELOCITY: 1.09 M/S
ECHO MV E VELOCITY: 0.89 M/S
ECHO MV E/A RATIO: 0.82
ECHO MV E/E' LATERAL: 6.85
ECHO MV E/E' RATIO (AVERAGED): 7.87
ECHO MV E/E' SEPTAL: 8.9
ECHO PV MAX VELOCITY: 1.2 M/S
ECHO PV PEAK GRADIENT: 6 MMHG
ECHO RV INTERNAL DIMENSION: 2.7 CM
EOSINOPHIL # BLD: 0 K/UL (ref 0–0.7)
EOSINOPHIL NFR BLD: 0.1 %
ERYTHROCYTE [DISTWIDTH] IN BLOOD BY AUTOMATED COUNT: 14.6 % (ref 11.5–14.5)
GLUCOSE SERPL-MCNC: 105 MG/DL (ref 70–99)
HCT VFR BLD AUTO: 33.6 % (ref 42–52)
HGB BLD-MCNC: 11.3 G/DL (ref 14–18)
LYMPHOCYTES # BLD: 0.6 K/UL (ref 1–4.8)
LYMPHOCYTES NFR BLD: 8.4 %
MAGNESIUM SERPL-MCNC: 2.4 MG/DL (ref 1.7–2.4)
MCH RBC QN AUTO: 32.1 PG (ref 27–31.3)
MCHC RBC AUTO-ENTMCNC: 33.6 % (ref 33–37)
MCV RBC AUTO: 95.5 FL (ref 79–92.2)
MONOCYTES # BLD: 1.1 K/UL (ref 0.2–0.8)
MONOCYTES NFR BLD: 14.4 %
NEUTROPHILS # BLD: 5.6 K/UL (ref 1.4–6.5)
NEUTS SEG NFR BLD: 76.9 %
PHOSPHATE SERPL-MCNC: 3.5 MG/DL (ref 2.3–4.8)
PLATELET # BLD AUTO: 267 K/UL (ref 130–400)
POTASSIUM SERPL-SCNC: 4 MEQ/L (ref 3.4–4.9)
PROCALCITONIN SERPL IA-MCNC: 0.29 NG/ML (ref 0–0.15)
RBC # BLD AUTO: 3.52 M/UL (ref 4.7–6.1)
SODIUM SERPL-SCNC: 142 MEQ/L (ref 135–144)
WBC # BLD AUTO: 7.4 K/UL (ref 4.8–10.8)

## 2024-08-17 PROCEDURE — 97166 OT EVAL MOD COMPLEX 45 MIN: CPT

## 2024-08-17 PROCEDURE — 2500000003 HC RX 250 WO HCPCS: Performed by: INTERNAL MEDICINE

## 2024-08-17 PROCEDURE — 93306 TTE W/DOPPLER COMPLETE: CPT | Performed by: INTERNAL MEDICINE

## 2024-08-17 PROCEDURE — 84145 PROCALCITONIN (PCT): CPT

## 2024-08-17 PROCEDURE — 6370000000 HC RX 637 (ALT 250 FOR IP): Performed by: INTERNAL MEDICINE

## 2024-08-17 PROCEDURE — 97162 PT EVAL MOD COMPLEX 30 MIN: CPT

## 2024-08-17 PROCEDURE — 83735 ASSAY OF MAGNESIUM: CPT

## 2024-08-17 PROCEDURE — 6360000002 HC RX W HCPCS: Performed by: INTERNAL MEDICINE

## 2024-08-17 PROCEDURE — 80069 RENAL FUNCTION PANEL: CPT

## 2024-08-17 PROCEDURE — 36415 COLL VENOUS BLD VENIPUNCTURE: CPT

## 2024-08-17 PROCEDURE — 94640 AIRWAY INHALATION TREATMENT: CPT

## 2024-08-17 PROCEDURE — 2580000003 HC RX 258: Performed by: INTERNAL MEDICINE

## 2024-08-17 PROCEDURE — 97163 PT EVAL HIGH COMPLEX 45 MIN: CPT

## 2024-08-17 PROCEDURE — 86140 C-REACTIVE PROTEIN: CPT

## 2024-08-17 PROCEDURE — 99223 1ST HOSP IP/OBS HIGH 75: CPT | Performed by: INTERNAL MEDICINE

## 2024-08-17 PROCEDURE — 94761 N-INVAS EAR/PLS OXIMETRY MLT: CPT

## 2024-08-17 PROCEDURE — 93306 TTE W/DOPPLER COMPLETE: CPT

## 2024-08-17 PROCEDURE — 2700000000 HC OXYGEN THERAPY PER DAY

## 2024-08-17 PROCEDURE — 85025 COMPLETE CBC W/AUTO DIFF WBC: CPT

## 2024-08-17 PROCEDURE — 1210000000 HC MED SURG R&B

## 2024-08-17 RX ORDER — ARFORMOTEROL TARTRATE 15 UG/2ML
15 SOLUTION RESPIRATORY (INHALATION)
Status: DISCONTINUED | OUTPATIENT
Start: 2024-08-17 | End: 2024-08-23 | Stop reason: HOSPADM

## 2024-08-17 RX ORDER — MIRTAZAPINE 30 MG/1
15 TABLET, FILM COATED ORAL NIGHTLY
Status: DISCONTINUED | OUTPATIENT
Start: 2024-08-17 | End: 2024-08-23 | Stop reason: HOSPADM

## 2024-08-17 RX ORDER — DOCUSATE SODIUM 100 MG/1
100 CAPSULE, LIQUID FILLED ORAL 2 TIMES DAILY
Status: DISCONTINUED | OUTPATIENT
Start: 2024-08-17 | End: 2024-08-23 | Stop reason: HOSPADM

## 2024-08-17 RX ORDER — BUPROPION HYDROCHLORIDE 100 MG/1
100 TABLET, EXTENDED RELEASE ORAL 2 TIMES DAILY
Status: DISCONTINUED | OUTPATIENT
Start: 2024-08-17 | End: 2024-08-23 | Stop reason: HOSPADM

## 2024-08-17 RX ORDER — SUCRALFATE 1 G/1
1 TABLET ORAL 3 TIMES DAILY
Status: DISCONTINUED | OUTPATIENT
Start: 2024-08-17 | End: 2024-08-23 | Stop reason: HOSPADM

## 2024-08-17 RX ORDER — DICYCLOMINE HYDROCHLORIDE 10 MG/1
10 CAPSULE ORAL 3 TIMES DAILY
Status: DISCONTINUED | OUTPATIENT
Start: 2024-08-17 | End: 2024-08-23 | Stop reason: HOSPADM

## 2024-08-17 RX ADMIN — DOCUSATE SODIUM 100 MG: 100 CAPSULE, LIQUID FILLED ORAL at 15:09

## 2024-08-17 RX ADMIN — IPRATROPIUM BROMIDE AND ALBUTEROL SULFATE 1 DOSE: 2.5; .5 SOLUTION RESPIRATORY (INHALATION) at 20:13

## 2024-08-17 RX ADMIN — SUCRALFATE 1 G: 1 TABLET ORAL at 20:52

## 2024-08-17 RX ADMIN — DOCUSATE SODIUM 100 MG: 100 CAPSULE, LIQUID FILLED ORAL at 20:52

## 2024-08-17 RX ADMIN — IPRATROPIUM BROMIDE AND ALBUTEROL SULFATE 1 DOSE: 2.5; .5 SOLUTION RESPIRATORY (INHALATION) at 08:17

## 2024-08-17 RX ADMIN — SUCRALFATE 1 G: 1 TABLET ORAL at 15:09

## 2024-08-17 RX ADMIN — BUPROPION HYDROCHLORIDE 100 MG: 100 TABLET, FILM COATED, EXTENDED RELEASE ORAL at 20:52

## 2024-08-17 RX ADMIN — DICYCLOMINE HYDROCHLORIDE 10 MG: 10 CAPSULE ORAL at 15:09

## 2024-08-17 RX ADMIN — ENOXAPARIN SODIUM 40 MG: 100 INJECTION SUBCUTANEOUS at 08:40

## 2024-08-17 RX ADMIN — MIRTAZAPINE 15 MG: 30 TABLET, FILM COATED ORAL at 20:52

## 2024-08-17 RX ADMIN — Medication 10 ML: at 08:41

## 2024-08-17 RX ADMIN — AZITHROMYCIN MONOHYDRATE 500 MG: 500 INJECTION, POWDER, LYOPHILIZED, FOR SOLUTION INTRAVENOUS at 08:55

## 2024-08-17 RX ADMIN — GUAIFENESIN SYRUP AND DEXTROMETHORPHAN 5 ML: 100; 10 SYRUP ORAL at 14:46

## 2024-08-17 RX ADMIN — GUAIFENESIN SYRUP AND DEXTROMETHORPHAN 5 ML: 100; 10 SYRUP ORAL at 18:53

## 2024-08-17 RX ADMIN — Medication 10 ML: at 20:54

## 2024-08-17 RX ADMIN — GUAIFENESIN SYRUP AND DEXTROMETHORPHAN 5 ML: 100; 10 SYRUP ORAL at 08:39

## 2024-08-17 RX ADMIN — METHYLPREDNISOLONE SODIUM SUCCINATE 40 MG: 40 INJECTION INTRAMUSCULAR; INTRAVENOUS at 08:39

## 2024-08-17 RX ADMIN — BUPROPION HYDROCHLORIDE 100 MG: 100 TABLET, FILM COATED, EXTENDED RELEASE ORAL at 15:09

## 2024-08-17 RX ADMIN — DICYCLOMINE HYDROCHLORIDE 10 MG: 10 CAPSULE ORAL at 20:52

## 2024-08-17 NOTE — PROGRESS NOTES
08/17/24 0815   RT Protocol   History Pulmonary Disease 2   Respiratory pattern 0   Breath sounds 2   Cough 0   Indications for Bronchodilator Therapy Decreased or absent breath sounds   Bronchodilator Assessment Score 4

## 2024-08-17 NOTE — CONSULTS
Pulmonary and Critical Care Medicine  Consult Note  Encounter Date: 2024 3:20 PM    Mr. Claude Xie is a 74 y.o. male  : 1950  Requesting Provider: Tomas Lee MD    Reason for request: Acute hypoxic and hypercapnic respiratory failure          HISTORY OF PRESENT ILLNESS:    Patient is 74 y.o. male who presented to the emergency department with complaints of hypoxia and generalized weakness.  He does have a past medical history of COPD, dementia, cavitary lesion in the right upper lobe, bronchiectasis, and weakness.  He did present to the emergency department with an SpO2 found to be 82% on room air.  Upon arrival to the emergency department reported a productive cough which has been ongoing for the past several days as well as wheezing.  He was placed on 4 L O2 per nasal cannula in the emergency department and given DuoNebs as well as oral prednisone.  He did undergo CT of the chest which did show evidence of bronchiectasis, cavitary lesion in the right upper lobe, as well as emphysema type changes.  Due to his oxygen requirement he was admitted to the medical floor.  Pulmonology has been consulted for COPD exacerbation and to evaluate cavitary lesion.  Currently the patient is resting comfortably in bed.  He is on 2 L O2 per nasal cannula.  He does continue with a productive cough, however states this is slightly improved since yesterday.  He denies any current complaints or concerns at this time.    Past Medical History:    History reviewed. No pertinent past medical history.    Past Surgical History:    History reviewed. No pertinent surgical history.    Social History:     reports that he has quit smoking. His smoking use included cigarettes. He has never used smokeless tobacco. He reports current alcohol use. He reports that he does not use drugs.    Family History:   History reviewed. No pertinent family history.    Allergies:  Patient has no known allergies.      MEDICATIONS during current    BUN 24*  --  24*   CREATININE 0.85 0.8 0.79   GLUCOSE 128*  --  105*       ABGs:   Recent Labs     08/16/24  1440   PHART 7.394   ECP5BCJ 48*   PO2ART 95   TBW6PLN 29.4*   BEART 5*   X3CNYFBT 97   KQB7DYJ 31     O2 Device: Nasal cannula  O2 Flow Rate (L/min): 2 L/min  No results found for: \"LACTA\"    Radiology:    CT CHEST WO CONTRAST    Result Date: 8/16/2024  EXAMINATION: CT OF THE CHEST WITHOUT CONTRAST 8/16/2024 7:32 pm TECHNIQUE: CT of the chest was performed without the administration of intravenous contrast. Multiplanar reformatted images are provided for review. Automated exposure control, iterative reconstruction, and/or weight based adjustment of the mA/kV was utilized to reduce the radiation dose to as low as reasonably achievable. COMPARISON: None HISTORY: ORDERING SYSTEM PROVIDED HISTORY: history of cavitary lung disease TECHNOLOGIST PROVIDED HISTORY: Reason for exam:->history of cavitary lung disease What reading provider will be dictating this exam?->CRC FINDINGS: Mediastinum: The heart is normal in size.  Mild calcified coronary atherosclerosis.  Minimal atherosclerosis is seen in the aorta.  No aneurysm. Probable pulmonary arterial hypertension, with dilation of the main pulmonary artery (normal caliber less than 3 cm) to 3.3 cm.  No lymphadenopathy Lungs/pleura: Severe emphysematous changes in the lungs.  Right upper lobe scarring with associated traction bronchiectasis.  There is a partially collapsed cavitary lesion in the posterior aspect of the right upper lobe. It measures approximately 6.4 x 5.4 x 3.4 cm.  The margins of the collapsed cavity is nodular.  Similar findings were observed on the CT of the cervical spine from 10/13/2022, when the cavitary lesion was only partially included in the field view of the study.  Small peripheral pulmonary opacities in the lower lobes may represent atelectasis or scarring. Upper Abdomen: No acute abnormalities in the visualized upper abdomen.  Small  calcified granuloma in the spleen. Soft Tissues/Bones: There are age indeterminate compression fracture deformities in the T1, T2 T6 and T11 vertebral bodies.     1. Partially collapsed cavitary lesion in the right upper lobe measuring 6.4 x 5.4 x 3.4 cm. The margins of the collapsed cavity are nodular. Similar findings were observed on the CT of the cervical spine from 10/13/2022, when the cavitary lesion was only partially included in the field view of the study.  An infectious/inflammatory etiology is therefore favored over malignancy.  However, as the study did not fully include the cavity, further evaluation with PET scan may be considered. 2. Severe emphysematous changes in the lungs. 3. Probable pulmonary arterial hypertension. 4. Age indeterminate compression fracture deformities in the T1, T2, T6 and T11 vertebral bodies.    If indicated, MRI may be obtained for further evaluation.     XR CHEST (SINGLE VIEW FRONTAL)    Result Date: 8/16/2024  EXAMINATION: ONE XRAY VIEW OF THE CHEST 8/16/2024 1:13 pm COMPARISON: None. HISTORY: ORDERING SYSTEM PROVIDED HISTORY: shortness of breath, coughing sputum TECHNOLOGIST PROVIDED HISTORY: Reason for exam:->shortness of breath, coughing sputum What reading provider will be dictating this exam?->CRC FINDINGS: Lung volumes are increased compatible with COPD.  There is apical pleural thickening with scarring and retraction of the stan superiorly.  Mild bilateral reticular prominence in both lungs.  No evidence of pleural fluid or pneumonia.  Heart is normal in size.  A linear tubular device projects over the right mediastinum into the lower right neck which represents the  shunt tube.  Visualized portions of the tube appear intact.     1. COPD. 2. Bilateral reticular prominence in both lungs. 3. No evidence of pleural fluid or pneumonia.         Assessment/Plan:       COPD exacerbation--he did present to the emergency department with complaints of dyspnea and hypoxia.  He

## 2024-08-17 NOTE — CONSULTS
Inpatient consult to Cardiology  Consult performed by: Epi Lim MD  Consult ordered by: Tomas Lee MD        Patient Name: Claude Xie  Admit Date: 2024 12:34 PM  MR #: [id number]  : 1950    Attending Physician: Tomas Lee MD  Reason for consult: Elevated troponins    History of Presenting Illness:      Claude Xie is a 74 y.o. male on hospital day 1 with a history of COPD, lung disease apparent the Mycobacterium infection which seems chronic, NPH status post  shunt, dementia, hypertension, hyperlipidemia, who was admitted to the hospital after sustaining a fall. History Obtained From:  patient, electronic medical record    In the ER patient was found hypoxic  CT of the chest consistent with inflammatory, infectious process, severe emphysema  Troponins elevated at 100 trended down to 87  EKG sinus rhythm without obvious signs of ischemia    History:      History reviewed. No pertinent past medical history.  History reviewed. No pertinent surgical history.  Family History  History reviewed. No pertinent family history.  [] Unable to obtain due to ventilated and/ or neurologic status  Social History     Socioeconomic History    Marital status:      Spouse name: Not on file    Number of children: Not on file    Years of education: Not on file    Highest education level: Not on file   Occupational History    Not on file   Tobacco Use    Smoking status: Former     Types: Cigarettes    Smokeless tobacco: Never    Tobacco comments:     Quit 6mo ago   Vaping Use    Vaping status: Never Used   Substance and Sexual Activity    Alcohol use: Yes     Comment: Occassionally    Drug use: Never    Sexual activity: Not Currently   Other Topics Concern    Not on file   Social History Narrative    Not on file     Social Determinants of Health     Financial Resource Strain: Low Risk  (2024)    Received from [facility]    Overall Financial Resource Strain (CARDIA)     Difficulty of  Paying Living Expenses: Not hard at all   Food Insecurity: No Food Insecurity (8/16/2024)    Hunger Vital Sign     Worried About Running Out of Food in the Last Year: Never true     Ran Out of Food in the Last Year: Never true   Transportation Needs: No Transportation Needs (8/16/2024)    PRAPARE - Transportation     Lack of Transportation (Medical): No     Lack of Transportation (Non-Medical): No   Physical Activity: Inactive (4/25/2024)    Received from [facility]    Exercise Vital Sign     Days of Exercise per Week: 0 days     Minutes of Exercise per Session: 0 min   Stress: No Stress Concern Present (4/25/2024)    Received from [facility]    [employer] [place] [employer] - Occupational Stress Questionnaire     Feeling of Stress : Only a little   Social Connections: Moderately Isolated (4/25/2024)    Received from [facility]    Social Connection and Isolation Panel [NHANES]     Frequency of Communication with Friends and Family: Twice a week     Frequency of Social Gatherings with Friends and Family: Once a week     Attends Nondenominational Services: Never     Active Member of Clubs or Organizations: Yes     Attends Club or Organization Meetings: 1 to 4 times per year     Marital Status:    Intimate Partner Violence: Not on file   Housing Stability: Low Risk  (8/16/2024)    Housing Stability Vital Sign     Unable to Pay for Housing in the Last Year: No     Number of Times Moved in the Last Year: 1     Homeless in the Last Year: No      [] Unable to obtain due to ventilated and/ or neurologic status    Home Medications:      Medications Prior to Admission: sucralfate (CARAFATE) 1 GM tablet, Take 1 tablet by mouth 3 times daily  Multiple Vitamins-Minerals (ONE-A-DAY 50 PLUS PO), Take 1 tablet by mouth daily (with breakfast)  mirtazapine (REMERON) 15 MG tablet, Take 1 tablet by mouth nightly  docusate (COLACE, DULCOLAX) 100 MG CAPS, Take 100 mg by mouth 2 times daily  dicyclomine

## 2024-08-17 NOTE — CARE COORDINATION
Case Management Assessment  Initial Evaluation    Date/Time of Evaluation: 8/17/2024 10:20 AM  Assessment Completed by: ELSA Sue    If patient is discharged prior to next notation, then this note serves as note for discharge by case management.    Patient Name: Claude Xie                   YOB: 1950  Diagnosis: COPD (chronic obstructive pulmonary disease) (HCC) [J44.9]  Elevated troponin [R79.89]  Chronic obstructive pulmonary disease, unspecified COPD type (HCC) [J44.9]                   Date / Time: 8/16/2024 12:34 PM    Patient Admission Status: Inpatient   Readmission Risk (Low < 19, Mod (19-27), High > 27): Readmission Risk Score: 12.3    Current PCP: Sheila Hernandez MD  PCP verified by CM? Yes    Chart Reviewed: Yes      History Provided by: Patient  Patient Orientation: Alert and Oriented    Patient Cognition: Alert    Hospitalization in the last 30 days (Readmission):  No    If yes, Readmission Assessment in CM Navigator will be completed.    Advance Directives:      Code Status: Full Code   Patient's Primary Decision Maker is: Legal Next of Kin    Primary Decision Maker: Jordan Mcdonald - Child - [phone]    Primary Decision Maker: Claude Xie Jr - Child - [phone]    Discharge Planning:    Patient lives with: Alone Type of Home: House  Primary Care Giver: Self  Patient Support Systems include: Children, Friends/Neighbors   Current Financial resources: Medicare  Current community resources: None  Current services prior to admission: None            Current DME:              Type of Home Care services:  None    ADLS  Prior functional level: Independent in ADLs/IADLs  Current functional level: Independent in ADLs/IADLs    PT AM-PAC:   /24  OT AM-PAC:   /24    Family can provide assistance at DC: Yes  Would you like Case Management to discuss the discharge plan with any other family members/significant others, and if so, who? Yes (DTR)  Plans to Return to Present

## 2024-08-17 NOTE — ACP (ADVANCE CARE PLANNING)
Advance Care Planning   Healthcare Decision Maker:    Primary Decision Maker: Jordan Mcdonald - Child - [phone]    Primary Decision Maker: Claude Xie Jr - Child - [phone]    Click here to complete Healthcare Decision Makers including selection of the Healthcare Decision Maker Relationship (ie \"Primary\").  Today we documented Decision Maker(s) consistent with Legal Next of Kin hierarchy.       Electronically signed by ELSA Sue on 8/17/2024 at 10:19 AM

## 2024-08-17 NOTE — PROGRESS NOTES
Physical Therapy Med Surg Initial Assessment  Facility/Department: [address] MED SURG UNIT  Room: [place] [id number]     NAME: Claude iXe  : 1950 (74 y.o.)  MRN: [id number]  CODE STATUS: Full Code    Date of Service: 2024    Patient Diagnosis(es): COPD (chronic obstructive pulmonary disease) ([facility]) [J44.9]  Elevated troponin [R79.89]  Chronic obstructive pulmonary disease, unspecified COPD type (HCC) [J44.9]   Chief Complaint   Patient presents with    Fatigue     Frequent falls     Patient Active Problem List    Diagnosis Date Noted    COPD exacerbation (HCC) 2024    COPD (chronic obstructive pulmonary disease) ([facility]) 2024      History reviewed. No pertinent past medical history.  History reviewed. No pertinent surgical history.  Chart Reviewed: Yes  Patient assessed for rehabilitation services?: Yes  Family / Caregiver Present: No  Restrictions:  Restrictions/Precautions: Fall Risk     SUBJECTIVE:   Subjective: Pt reports repeated falls recently at home and generalized weakness    Pain  Pre treatment screening: denies  Post treatment screening: denies    Prior Level of Function:  Social/Functional History  Lives With: Alone  Type of Home: House  Home Layout: Two level, Bed/Bath upstairs (has chair lift)  Home Access: Stairs to enter with rails  Entrance Stairs - Number of Steps: 3  Entrance Stairs - Rails: Right (up)  Bathroom Shower/Tub: Tub/Shower unit  Bathroom Equipment: Grab bars in shower, Hand-held shower  Home Equipment: Cane, Rollator, Walker - Rolling  ADL Assistance: Independent  Homemaking Assistance:  (in home assistance started this week)  Ambulation Assistance: Independent (furniture walks)  Transfer Assistance: Independent  Active : Yes (\"very little local\")    OBJECTIVE:   Vision  Vision: Impaired  Vision Exceptions: Wears glasses at all times  Hearing: Exceptions to WFL  Hearing Exceptions: Bilateral hearing aid    Cognition:  Overall Orientation Status: Within Functional  Limits  Follows Commands: Within Functional Limits  Cognition Comment: per chart review history of dementia , pt follows simple commands with repetition    Observation/Palpation  Posture: Poor (forward head, rounded shoulders, increased T kyphosis, posterior pelvic tilt, multiple poster LOB while seated EOB with Mod A for balance recovery)  Observation: alert, cooperative, on 2L O2 NC, SPO2 89-93% during and post mobility    ROM:  AROM: Within functional limits  PROM: Generally decreased, functional    Strength:  Strength: Grossly decreased, non-functional (B LEs functionally 3-/5, core 2-/5)    Neuro:  Balance  Sitting - Static: Poor (Sushil with occasional Mod A d/t posterior LOB x 4)  Sitting - Dynamic: Poor;-  Standing - Static: Poor (Mod A of 2 person with B UE support on FWW)  Standing - Dynamic: Poor;-  Comments: decreased tolerance to sitting activity ~5 min, standing ~45 seconds   Tone: Normal  Coordination: Generally decreased, functional  Sensation: Intact    Bed mobility  Supine to Sit: Moderate assistance  Sit to Supine: Moderate assistance    Transfers  Sit to Stand: Moderate Assistance;2 Person Assistance  Stand to Sit: Moderate Assistance;2 Person Assistance  Comment: with FWW  Ambulation  Assistance: Maximum assistance;2 Person assistance  Distance: 0ft  Comments: Pre gait with FWW only, wt shifitng with PT blocking stance limb knee to prevent buckling, poor quad control/strength and retro LOB     Activity Tolerance  Activity Tolerance: Patient limited by endurance    Patient Education  Education Given To: Patient  Education Provided: Role of Therapy;Plan of Care  Education Provided Comments: d/c recommendation  Education Method: Verbal  Barriers to Learning: Cognition (decreased insight into deficits)  Education Outcome: Verbalized understanding     ASSESSMENT:   Therapy Prognosis: Good  Barriers to Learning: cognitive deficits, poor insight into deficits     Body Structures, Functions, Activity    Definitions for assistance levels  Independent = pt does not require any physical supervision or assistance from another person for activity completion. Device may be needed.  Stand by assistance = pt requires verbal cues or instructions from another person, close to but not touching, to perform the activity  Minimal assistance= pt performs 75% or more of the activity; assistance is required to complete the activity  Moderate assistance= pt performs 50% of the activity; assistance is required to complete the activity  Maximal assistance = pt performs 25% of the activity; assistance is required to complete the activity  Dependent = pt requires total physical assistance to accomplish the task

## 2024-08-17 NOTE — PLAN OF CARE
Problem: Discharge Planning  Goal: Discharge to home or other facility with appropriate resources  8/17/2024 0319 by Lida Cordova, RN  Outcome: Progressing  8/16/2024 1736 by Roxanne Mckeon RN  Outcome: Progressing     Problem: Skin/Tissue Integrity  Goal: Absence of new skin breakdown  Description: 1.  Monitor for areas of redness and/or skin breakdown  2.  Assess vascular access sites hourly  3.  Every 4-6 hours minimum:  Change oxygen saturation probe site  4.  Every 4-6 hours:  If on nasal continuous positive airway pressure, respiratory therapy assess nares and determine need for appliance change or resting period.  8/17/2024 0319 by Lida Cordova, RN  Outcome: Progressing  8/16/2024 1736 by Roxanne Mckeon RN  Outcome: Progressing     Problem: Safety - Adult  Goal: Free from fall injury  8/17/2024 0319 by Lida Cordova, RN  Outcome: Progressing  8/16/2024 1736 by Roxanne Mckeon RN  Outcome: Progressing

## 2024-08-17 NOTE — PROGRESS NOTES
Hospitalist Progress Note      PCP: Sheila Hernandez MD    Date of Admission: 8/16/2024    Chief Complaint:  no acute events, afebrile, stable HD, on 2 liters of O2    Medications:  Reviewed    Infusion Medications    sodium chloride       Scheduled Medications    sodium chloride flush  5-40 mL IntraVENous 2 times per day    enoxaparin  40 mg SubCUTAneous Daily    azithromycin  500 mg IntraVENous Q24H    methylPREDNISolone  40 mg IntraVENous Daily    ipratropium 0.5 mg-albuterol 2.5 mg  1 Dose Inhalation BID RT     PRN Meds: sodium chloride flush, sodium chloride, ondansetron **OR** ondansetron, polyethylene glycol, acetaminophen **OR** acetaminophen, guaiFENesin-dextromethorphan, ipratropium 0.5 mg-albuterol 2.5 mg      Intake/Output Summary (Last 24 hours) at 8/17/2024 1407  Last data filed at 8/17/2024 0841  Gross per 24 hour   Intake 10 ml   Output --   Net 10 ml       Exam:    BP (!) 94/58   Pulse 63   Temp 97.8 °F (36.6 °C) (Oral)   Resp 20   Ht 1.803 m (5' 11\")   Wt 72.6 kg (160 lb)   SpO2 95%   BMI 22.32 kg/m²     General appearance: alert, cooperative  Lungs: diminished with faint wheezing bilaterally  Heart: S1/S2,RRR  Abdomen: soft, active BS  Extremities: no edema         Labs:   Recent Labs     08/16/24  1301 08/16/24  1440 08/17/24  0710   WBC 9.4  --  7.4   HGB 12.1* 12.4* 11.3*   HCT 35.3*  --  33.6*     --  267     Recent Labs     08/16/24  1301 08/16/24  1440 08/17/24  0710     --  142   K 4.1  --  4.0     --  105   CO2 28  --  28   BUN 24*  --  24*   CREATININE 0.85 0.8 0.79   CALCIUM 9.7  --  9.0   PHOS  --   --  3.5     No results for input(s): \"AST\", \"ALT\", \"BILIDIR\", \"BILITOT\", \"ALKPHOS\" in the last 72 hours.  No results for input(s): \"INR\" in the last 72 hours.  No results for input(s): \"CKTOTAL\", \"TROPONINI\" in the last 72 hours.    Urinalysis:    No results found for: \"NITRU\", \"WBCUA\", \"BACTERIA\", \"RBCUA\", \"BLOODU\", \"SPECGRAV\", \"GLUCOSEU\"    Radiology:  CT  CHEST WO CONTRAST   Final Result   1. Partially collapsed cavitary lesion in the right upper lobe measuring 6.4   x 5.4 x 3.4 cm. The margins of the collapsed cavity are nodular. Similar   findings were observed on the CT of the cervical spine from 10/13/2022, when   the cavitary lesion was only partially included in the field view of the   study.  An infectious/inflammatory etiology is therefore favored over   malignancy.  However, as the study did not fully include the cavity, further   evaluation with PET scan may be considered.   2. Severe emphysematous changes in the lungs.   3. Probable pulmonary arterial hypertension.   4. Age indeterminate compression fracture deformities in the T1, T2, T6 and   T11 vertebral bodies.    If indicated, MRI may be obtained for further   evaluation.         XR CHEST (SINGLE VIEW FRONTAL)   Final Result   1. COPD.   2. Bilateral reticular prominence in both lungs.   3. No evidence of pleural fluid or pneumonia.                 Assessment/Plan:    74 y.o. male with PMH of severe COPD,Mycobacterium Xenopi cavitary lung disease,  NPH s/p  shunt, dementia, depression, who presented with:     Acute hypoxic/hypercapnic respiratory failure  - due to acute COPD exacerbation   - requiring 3 liters of O2 on arrival, down to 2 liters today  - CT chest showed chronic RUL cavitary lesion and severe emphysema  - continue Duonebs, Zithromax, Solumedrol  - consulted pulmonology     Elevated troponins  - in  the setting of acute respiratory illness  - ECG showed SR with no acute ischemic changes per report  - consulted cardiology  - monitor on telemetry       Diet: ADULT DIET; Regular    Code Status: Full Code              Electronically signed by JONH BARCLAY MD on 8/17/2024 at 2:07 PM

## 2024-08-17 NOTE — PROGRESS NOTES
See OT evaluation for all goals and OT POC. Electronically signed by Holland Jeronimo OTR/L on 8/17/2024 at 4:27 PM

## 2024-08-17 NOTE — PROGRESS NOTES
[facility] OCCUPATIONAL THERAPY EVALUATION - ACUTE     NAME: Claude Xie  : 1950 (74 y.o.)  MRN: [id number]  CODE STATUS: Full Code  Room: [place] [id number]    Date of Service: 2024    Patient Diagnosis(es): COPD (chronic obstructive pulmonary disease) (HCC) [J44.9]  Elevated troponin [R79.89]  Chronic obstructive pulmonary disease, unspecified COPD type (HCC) [J44.9]   Patient Active Problem List    Diagnosis Date Noted    COPD (chronic obstructive pulmonary disease) (HCC) 2024        History reviewed. No pertinent past medical history.  History reviewed. No pertinent surgical history.     Restrictions  Restrictions/Precautions: Fall Risk              Safety Devices: Safety Devices  Type of Devices: All fall risk precautions in place     Patient's date of birth confirmed: Yes    General:  Patient assessed for rehabilitation services?: Yes    Subjective          Pain at start of treatment: No    Pain at end of treatment: No    Location: n/a  Description: n/a  Nursing notified: No  Intervention: None    Prior Level of Function:  Social/Functional History  Lives With: Alone  Type of Home: House  Home Layout: Two level, Bed/Bath upstairs (has chair lift)  Home Access: Stairs to enter with rails  Entrance Stairs - Number of Steps: 3  Entrance Stairs - Rails: Right (up)  Bathroom Shower/Tub: Tub/Shower unit  Bathroom Equipment: Grab bars in shower, Hand-held shower  Home Equipment: Cane, Rollator, Walker - Rolling  Has the patient had two or more falls in the past year or any fall with injury in the past year?: Yes  ADL Assistance: Independent  Homemaking Assistance:  (in home assistance started this week)  Ambulation Assistance: Independent (furniture walks)  Transfer Assistance: Independent  Active : Yes (\"very little local\")    OBJECTIVE:     Orientation Status:  Orientation  Overall Orientation Status: Within Functional Limits    Observation:  Observation/Palpation  Observation: pt on 2L O2 via  and taking off regular upper body clothing?: A Little  How much help is needed for taking care of personal grooming?: A Little  How much help for eating meals?: A Little  AM-PAC Inpatient Daily Activity Raw Score: 18  AM-PAC Inpatient ADL T-Scale Score : 38.66  ADL Inpatient CMS 0-100% Score: 46.65    Therapy key for assistance levels -   Independent/Mod I = Pt. is able to perform task with no assistance but may require a device   Stand by assistance = Pt. does not perform task at an independent level but does not need physical assistance, requires verbal cues  Minimal, Moderate, Maximal Assistance = Pt. requires physical assistance (25%, 50%, 75% assist from helper) for task but is able to actively participate in task   Dependent = Pt. requires total assistance with task and is not able to actively participate with task completion     Plan:  Occupational Therapy Plan  Times Per Week: 1-4  Therapy Duration:  (LOS)  Current Treatment Recommendations: Strengthening, Balance training, Functional mobility training, Endurance training, Safety education & training, Patient/Caregiver education & training, Equipment evaluation, education, & procurement, Self-Care / ADL, Coordination training, Home management training    Goals:   Patient will:    - Improve functional endurance to tolerate/complete 8-10 mins of ADL's  - Be Supervised in UB ADLs   - Be Supervised in LB ADLs  - Be Supervised  in ADL transfers without LOB  - Be Supervised  in toileting tasks  - Improve B UE strength and endurance to increase by 1/2 MM grade in order to participate in self-care activities as projected.    Patient Goal:    get better and go home    Discussed and agreed upon: Yes Comments:       Therapy Time:   Individual   Time In 1548   Time Out 1608   Minutes 20          Eval: 20 minutes     Electronically signed by:    LINH Che/L,   8/17/2024, 4:26 PM

## 2024-08-18 LAB
ALBUMIN SERPL-MCNC: 3.4 G/DL (ref 3.5–4.6)
ANION GAP SERPL CALCULATED.3IONS-SCNC: 8 MEQ/L (ref 9–15)
BASOPHILS # BLD: 0 K/UL (ref 0–0.2)
BASOPHILS NFR BLD: 0.3 %
BUN SERPL-MCNC: 19 MG/DL (ref 8–23)
CALCIUM SERPL-MCNC: 8.9 MG/DL (ref 8.5–9.9)
CHLORIDE SERPL-SCNC: 104 MEQ/L (ref 95–107)
CO2 SERPL-SCNC: 29 MEQ/L (ref 20–31)
CREAT SERPL-MCNC: 0.67 MG/DL (ref 0.7–1.2)
CRP SERPL HS-MCNC: 136 MG/L (ref 0–5)
EOSINOPHIL # BLD: 0.1 K/UL (ref 0–0.7)
EOSINOPHIL NFR BLD: 1.4 %
ERYTHROCYTE [DISTWIDTH] IN BLOOD BY AUTOMATED COUNT: 14.4 % (ref 11.5–14.5)
GLUCOSE SERPL-MCNC: 93 MG/DL (ref 70–99)
HCT VFR BLD AUTO: 34.8 % (ref 42–52)
HGB BLD-MCNC: 11.3 G/DL (ref 14–18)
LYMPHOCYTES # BLD: 0.8 K/UL (ref 1–4.8)
LYMPHOCYTES NFR BLD: 9.7 %
MAGNESIUM SERPL-MCNC: 2.3 MG/DL (ref 1.7–2.4)
MCH RBC QN AUTO: 31.1 PG (ref 27–31.3)
MCHC RBC AUTO-ENTMCNC: 32.5 % (ref 33–37)
MCV RBC AUTO: 95.9 FL (ref 79–92.2)
MONOCYTES # BLD: 0.9 K/UL (ref 0.2–0.8)
MONOCYTES NFR BLD: 11.7 %
NEUTROPHILS # BLD: 6 K/UL (ref 1.4–6.5)
NEUTS SEG NFR BLD: 76.6 %
PHOSPHATE SERPL-MCNC: 2.8 MG/DL (ref 2.3–4.8)
PLATELET # BLD AUTO: 284 K/UL (ref 130–400)
POTASSIUM SERPL-SCNC: 3.9 MEQ/L (ref 3.4–4.9)
PROCALCITONIN SERPL IA-MCNC: 0.19 NG/ML (ref 0–0.15)
RBC # BLD AUTO: 3.63 M/UL (ref 4.7–6.1)
SODIUM SERPL-SCNC: 141 MEQ/L (ref 135–144)
WBC # BLD AUTO: 7.8 K/UL (ref 4.8–10.8)

## 2024-08-18 PROCEDURE — 85025 COMPLETE CBC W/AUTO DIFF WBC: CPT

## 2024-08-18 PROCEDURE — 83735 ASSAY OF MAGNESIUM: CPT

## 2024-08-18 PROCEDURE — 99232 SBSQ HOSP IP/OBS MODERATE 35: CPT | Performed by: INTERNAL MEDICINE

## 2024-08-18 PROCEDURE — 6370000000 HC RX 637 (ALT 250 FOR IP): Performed by: INTERNAL MEDICINE

## 2024-08-18 PROCEDURE — 80069 RENAL FUNCTION PANEL: CPT

## 2024-08-18 PROCEDURE — 36415 COLL VENOUS BLD VENIPUNCTURE: CPT

## 2024-08-18 PROCEDURE — 2580000003 HC RX 258: Performed by: INTERNAL MEDICINE

## 2024-08-18 PROCEDURE — 2500000003 HC RX 250 WO HCPCS: Performed by: INTERNAL MEDICINE

## 2024-08-18 PROCEDURE — 94640 AIRWAY INHALATION TREATMENT: CPT

## 2024-08-18 PROCEDURE — 1210000000 HC MED SURG R&B

## 2024-08-18 PROCEDURE — 2700000000 HC OXYGEN THERAPY PER DAY

## 2024-08-18 PROCEDURE — 84145 PROCALCITONIN (PCT): CPT

## 2024-08-18 PROCEDURE — 94761 N-INVAS EAR/PLS OXIMETRY MLT: CPT

## 2024-08-18 PROCEDURE — 6360000002 HC RX W HCPCS: Performed by: INTERNAL MEDICINE

## 2024-08-18 PROCEDURE — 99222 1ST HOSP IP/OBS MODERATE 55: CPT | Performed by: INTERNAL MEDICINE

## 2024-08-18 PROCEDURE — 86140 C-REACTIVE PROTEIN: CPT

## 2024-08-18 RX ADMIN — MIRTAZAPINE 15 MG: 30 TABLET, FILM COATED ORAL at 21:04

## 2024-08-18 RX ADMIN — GUAIFENESIN SYRUP AND DEXTROMETHORPHAN 5 ML: 100; 10 SYRUP ORAL at 14:05

## 2024-08-18 RX ADMIN — GUAIFENESIN SYRUP AND DEXTROMETHORPHAN 5 ML: 100; 10 SYRUP ORAL at 09:24

## 2024-08-18 RX ADMIN — BUPROPION HYDROCHLORIDE 100 MG: 100 TABLET, FILM COATED, EXTENDED RELEASE ORAL at 08:34

## 2024-08-18 RX ADMIN — GUAIFENESIN SYRUP AND DEXTROMETHORPHAN 5 ML: 100; 10 SYRUP ORAL at 21:04

## 2024-08-18 RX ADMIN — SUCRALFATE 1 G: 1 TABLET ORAL at 21:04

## 2024-08-18 RX ADMIN — DICYCLOMINE HYDROCHLORIDE 10 MG: 10 CAPSULE ORAL at 08:35

## 2024-08-18 RX ADMIN — DICYCLOMINE HYDROCHLORIDE 10 MG: 10 CAPSULE ORAL at 14:05

## 2024-08-18 RX ADMIN — SUCRALFATE 1 G: 1 TABLET ORAL at 14:05

## 2024-08-18 RX ADMIN — AZITHROMYCIN MONOHYDRATE 500 MG: 500 INJECTION, POWDER, LYOPHILIZED, FOR SOLUTION INTRAVENOUS at 09:23

## 2024-08-18 RX ADMIN — DOCUSATE SODIUM 100 MG: 100 CAPSULE, LIQUID FILLED ORAL at 08:35

## 2024-08-18 RX ADMIN — Medication 10 ML: at 21:04

## 2024-08-18 RX ADMIN — ENOXAPARIN SODIUM 40 MG: 100 INJECTION SUBCUTANEOUS at 08:35

## 2024-08-18 RX ADMIN — IPRATROPIUM BROMIDE AND ALBUTEROL SULFATE 1 DOSE: 2.5; .5 SOLUTION RESPIRATORY (INHALATION) at 20:03

## 2024-08-18 RX ADMIN — METHYLPREDNISOLONE SODIUM SUCCINATE 40 MG: 40 INJECTION INTRAMUSCULAR; INTRAVENOUS at 08:36

## 2024-08-18 RX ADMIN — SUCRALFATE 1 G: 1 TABLET ORAL at 08:35

## 2024-08-18 RX ADMIN — IPRATROPIUM BROMIDE AND ALBUTEROL SULFATE 1 DOSE: 2.5; .5 SOLUTION RESPIRATORY (INHALATION) at 10:46

## 2024-08-18 RX ADMIN — GUAIFENESIN SYRUP AND DEXTROMETHORPHAN 5 ML: 100; 10 SYRUP ORAL at 05:27

## 2024-08-18 RX ADMIN — DOCUSATE SODIUM 100 MG: 100 CAPSULE, LIQUID FILLED ORAL at 21:04

## 2024-08-18 RX ADMIN — DICYCLOMINE HYDROCHLORIDE 10 MG: 10 CAPSULE ORAL at 21:05

## 2024-08-18 RX ADMIN — Medication 10 ML: at 08:41

## 2024-08-18 RX ADMIN — BUPROPION HYDROCHLORIDE 100 MG: 100 TABLET, FILM COATED, EXTENDED RELEASE ORAL at 21:04

## 2024-08-18 ASSESSMENT — PAIN SCALES - GENERAL: PAINLEVEL_OUTOF10: 0

## 2024-08-18 NOTE — PLAN OF CARE
Problem: Safety - Adult  Goal: Free from fall injury  Outcome: Progressing  Flowsheets (Taken 8/18/2024 0641)  Free From Fall Injury:   Instruct family/caregiver on patient safety   Based on caregiver fall risk screen, instruct family/caregiver to ask for assistance with transferring infant if caregiver noted to have fall risk factors     Problem: Discharge Planning  Goal: Discharge to home or other facility with appropriate resources  Outcome: Progressing  Flowsheets (Taken 8/17/2024 0898 by Jennifer Muñoz, RN)  Discharge to home or other facility with appropriate resources:   Identify barriers to discharge with patient and caregiver   Arrange for needed discharge resources and transportation as appropriate   Identify discharge learning needs (meds, wound care, etc)

## 2024-08-18 NOTE — PROGRESS NOTES
Patient has been cleared by Dr. Lim for discharge. Refuses to turn while in bed, education provided.

## 2024-08-18 NOTE — PROGRESS NOTES
Cardiology progress note    Patient Name: Claude Xie  Admit Date: 2024 12:34 PM  MR #: [id number]  : 1950    Attending Physician: Tomas Lee MD  Reason for consult: Elevated troponins    History of Presenting Illness:      Claude Xie is a 74 y.o. male on hospital day 2 with a history of COPD, lung disease apparent the Mycobacterium infection which seems chronic, NPH status post  shunt, dementia, hypertension, hyperlipidemia, who was admitted to the hospital after sustaining a fall. History Obtained From:  patient, electronic medical record    In the ER patient was found hypoxic  CT of the chest consistent with inflammatory, infectious process, severe emphysema  Troponins elevated at 100 trended down to 87  EKG sinus rhythm without obvious signs of ischemia  ==============  Hospital course  2024  Patient laying in bed looks comfortable  Denies chest pain  Still having productive cough  Daughter and son-in-law at bedside    History:      History reviewed. No pertinent past medical history.  History reviewed. No pertinent surgical history.  Family History  History reviewed. No pertinent family history.  [] Unable to obtain due to ventilated and/ or neurologic status  Social History     Socioeconomic History    Marital status:      Spouse name: Not on file    Number of children: Not on file    Years of education: Not on file    Highest education level: Not on file   Occupational History    Not on file   Tobacco Use    Smoking status: Former     Types: Cigarettes    Smokeless tobacco: Never    Tobacco comments:     Quit 6mo ago   Vaping Use    Vaping status: Never Used   Substance and Sexual Activity    Alcohol use: Yes     Comment: Occassionally    Drug use: Never    Sexual activity: Not Currently   Other Topics Concern    Not on file   Social History Narrative    Not on file     Social Determinants of Health     Financial Resource Strain: Low Risk  (2024)    Received from  [phone]   Resp: 18 16 16 14   Temp: 98.1 °F (36.7 °C)  98.4 °F (36.9 °C) 97.5 °F (36.4 °C)   TempSrc: Oral  Oral Oral   SpO2: 96% 95% 98% 98%   Weight:       Height:            Physical Examination:  General: Alert oriented x4 no acute distress  HEENT: Normocephalic, No scleral icterus.  Neck: No JVD.  Heart: Regular, no murmur, no rub/gallop. No RV heave.  Lungs: Clear to ascultation, no rales/wheezing/rhonchi. Good chest wall excursion.  Abdomen: Soft non tender non distended   extremities: No clubbing/cyanosis, no edema.   Skin: Warm, dry, normal turgor, no rash, no bruise, no petichiae.  Neuro: No myoclonus or tremor.   Psych: Normal affect    Results/ Medications reviewed 8/18/2024, 2:43 PM     Laboratory, Microbiology, Pathology, Radiology, Cardiology, Medications and Transcriptions reviewed  Scheduled Meds:   arformoterol tartrate  15 mcg Nebulization BID RT    buPROPion  100 mg Oral BID    dicyclomine  10 mg Oral TID    docusate sodium  100 mg Oral BID    mirtazapine  15 mg Oral Nightly    sucralfate  1 g Oral TID    sodium chloride flush  5-40 mL IntraVENous 2 times per day    enoxaparin  40 mg SubCUTAneous Daily    azithromycin  500 mg IntraVENous Q24H    methylPREDNISolone  40 mg IntraVENous Daily    ipratropium 0.5 mg-albuterol 2.5 mg  1 Dose Inhalation BID RT     Continuous Infusions:   sodium chloride         Recent Labs     08/16/24  1301 08/16/24  1440 08/17/24  0710 08/18/24  0748   WBC 9.4  --  7.4 7.8   HGB 12.1* 12.4* 11.3* 11.3*   HCT 35.3*  --  33.6* 34.8*   MCV 94.4*  --  95.5* 95.9*     --  267 284     Recent Labs     08/16/24  1301 08/16/24  1440 08/17/24  0710 08/18/24  0748     --  142 141   K 4.1  --  4.0 3.9     --  105 104   CO2 28  --  28 29   PHOS  --   --  3.5 2.8   BUN 24*  --  24* 19   CREATININE 0.85 0.8 0.79 0.67*     No results for input(s): \"INR\" in the last 72 hours.    Invalid input(s): \"PROT\"  No results found for this or any previous visit.

## 2024-08-18 NOTE — PROGRESS NOTES
08/17/24 2200   RT Protocol   History Pulmonary Disease 2   Respiratory pattern 0   Breath sounds 2   Cough 0   Indications for Bronchodilator Therapy Decreased or absent breath sounds   Bronchodilator Assessment Score 4

## 2024-08-18 NOTE — PROGRESS NOTES
Pulmonary & Critical Care Medicine Progress Note    Subjective:     No events reported overnight.  He does continue on 2 L O2 per nasal cannula.  He was given as needed Robitussin throughout the night and early this morning.  The patient reports that this is assisting with him expectorating secretions.  He did work with physical therapy yesterday who is recommending he go to inpatient rehab.  He denies further complaints or concerns at this time.    EXAM:  General: Resting comfortably in bed.  No signs of distress noted.  HEENT: Normocephalic, atraumatic.  Pupils equal round and reactive to light.  Nasal cannula in place.  Lungs : Bilateral expiratory wheezes noted.  No rhonchi or rales.  Heart: Regular rate.  S1 and S2  ABD: Soft, nontender, nondistended.  Bowel sounds within normal limits  Extremities : No edema noted  Neuro: Awake, alert, oriented  Skin: No rashes    IV:   sodium chloride         Vitals:  BP (!) 140/67   Pulse 57   Temp 97.5 °F (36.4 °C) (Oral)   Resp 14   Ht 1.803 m (5' 11\")   Wt 72.6 kg (160 lb)   SpO2 98%   BMI 22.32 kg/m²          Intake/Output Summary (Last 24 hours) at 8/18/2024 1200  Last data filed at 8/17/2024 2057  Gross per 24 hour   Intake 540 ml   Output 1300 ml   Net -760 ml       Medications:  Scheduled Meds:   arformoterol tartrate  15 mcg Nebulization BID RT    buPROPion  100 mg Oral BID    dicyclomine  10 mg Oral TID    docusate sodium  100 mg Oral BID    mirtazapine  15 mg Oral Nightly    sucralfate  1 g Oral TID    sodium chloride flush  5-40 mL IntraVENous 2 times per day    enoxaparin  40 mg SubCUTAneous Daily    azithromycin  500 mg IntraVENous Q24H    methylPREDNISolone  40 mg IntraVENous Daily    ipratropium 0.5 mg-albuterol 2.5 mg  1 Dose Inhalation BID RT       Labs:   CBC:   Recent Labs     08/16/24  1301 08/16/24  1440 08/17/24  0710 08/18/24  0748   WBC 9.4  --  7.4 7.8   HGB 12.1* 12.4* 11.3* 11.3*   HCT 35.3*  --  33.6* 34.8*   MCV 94.4*  --  95.5* 95.9*      --  267 284     BMP:   Recent Labs     08/16/24  1301 08/16/24  1440 08/17/24  0710 08/18/24  0748     --  142 141   K 4.1  --  4.0 3.9     --  105 104   CO2 28  --  28 29   PHOS  --   --  3.5 2.8   BUN 24*  --  24* 19   CREATININE 0.85 0.8 0.79 0.67*     LIVER PROFILE: No results for input(s): \"AST\", \"ALT\", \"LIPASE\", \"AMYLASE\", \"BILIDIR\", \"BILITOT\", \"ALKPHOS\" in the last 72 hours.    Invalid input(s): \"ALB\"  PT/INR: No results for input(s): \"PROTIME\", \"INR\" in the last 72 hours.  APTT: No results for input(s): \"APTT\" in the last 72 hours.  UA:No results for input(s): \"NITRITE\", \"COLORU\", \"PHUR\", \"LABCAST\", \"WBCUA\", \"RBCUA\", \"MUCUS\", \"TRICHOMONAS\", \"YEAST\", \"BACTERIA\", \"CLARITYU\", \"SPECGRAV\", \"LEUKOCYTESUR\", \"UROBILINOGEN\", \"BILIRUBINUR\", \"BLOODU\", \"GLUCOSEU\", \"AMORPHOUS\" in the last 72 hours.    Invalid input(s): \"KETONESU\"    Radiology:    CXR: 2-view: No results found for this or any previous visit.              Portable: No results found for this or any previous visit.      Assessment/Plan:    COPD exacerbation-he does continue on 2 L O2 per nasal cannula.  This was decreased to 1 L at the time of my assessment.  He he does not currently utilize any oxygen at home.  We should continue to wean liter flow as tolerated to maintain SpO2 greater than 90%.  He does continue on azithromycin and prednisone.  Sputum culture from 8/16/2024 shows no growth.  Repeat culture completed on 8/17/2024 is currently pending.  Brovana and scheduled DuoNebs are also in place.  He does continue with as needed albuterol as well.    Cavitary lesion noted to the right upper lobe- he did undergo CT of the chest in the emergency department which did show evidence of a cavitary lesion in the right upper lobe. The radiologist report did mention similar changes found in a cervical spine CT completed in 2022. Upon further evaluation in care everywhere, the patient is currently being seen by [facility] pulmonology  who is monitoring the lesion with annual scans. At at this time he will continue on azithromycin. He should continue to follow-up with pulmonology in the outpatient setting.       Electronically signed by CHARITY Fairchild CNP, on 8/18/2024 at 12:00 PM

## 2024-08-18 NOTE — PROGRESS NOTES
Hospitalist Progress Note      PCP: Sheila Hernandez MD    Date of Admission: 8/16/2024    Chief Complaint:  no acute events, afebrile, stable HD, on 2 liters of O2    Medications:  Reviewed    Infusion Medications    sodium chloride       Scheduled Medications    arformoterol tartrate  15 mcg Nebulization BID RT    buPROPion  100 mg Oral BID    dicyclomine  10 mg Oral TID    docusate sodium  100 mg Oral BID    mirtazapine  15 mg Oral Nightly    sucralfate  1 g Oral TID    sodium chloride flush  5-40 mL IntraVENous 2 times per day    enoxaparin  40 mg SubCUTAneous Daily    azithromycin  500 mg IntraVENous Q24H    methylPREDNISolone  40 mg IntraVENous Daily    ipratropium 0.5 mg-albuterol 2.5 mg  1 Dose Inhalation BID RT     PRN Meds: sodium chloride flush, sodium chloride, ondansetron **OR** ondansetron, polyethylene glycol, acetaminophen **OR** acetaminophen, guaiFENesin-dextromethorphan, ipratropium 0.5 mg-albuterol 2.5 mg      Intake/Output Summary (Last 24 hours) at 8/18/2024 1142  Last data filed at 8/17/2024 2057  Gross per 24 hour   Intake 540 ml   Output 1300 ml   Net -760 ml       Exam:    BP (!) 140/67   Pulse 57   Temp 97.5 °F (36.4 °C) (Oral)   Resp 14   Ht 1.803 m (5' 11\")   Wt 72.6 kg (160 lb)   SpO2 98%   BMI 22.32 kg/m²     General appearance: alert, cooperative  Lungs: diminished with faint wheezing bilaterally  Heart: S1/S2,RRR  Abdomen: soft, active BS  Extremities: no edema         Labs:   Recent Labs     08/16/24  1301 08/16/24  1440 08/17/24  0710 08/18/24  0748   WBC 9.4  --  7.4 7.8   HGB 12.1* 12.4* 11.3* 11.3*   HCT 35.3*  --  33.6* 34.8*     --  267 284     Recent Labs     08/16/24  1301 08/16/24  1440 08/17/24  0710 08/18/24  0748     --  142 141   K 4.1  --  4.0 3.9     --  105 104   CO2 28  --  28 29   BUN 24*  --  24* 19   CREATININE 0.85 0.8 0.79 0.67*   CALCIUM 9.7  --  9.0 8.9   PHOS  --   --  3.5 2.8     No results for input(s): \"AST\", \"ALT\",  \"BILIDIR\", \"BILITOT\", \"ALKPHOS\" in the last 72 hours.  No results for input(s): \"INR\" in the last 72 hours.  No results for input(s): \"CKTOTAL\", \"TROPONINI\" in the last 72 hours.    Urinalysis:    No results found for: \"NITRU\", \"WBCUA\", \"BACTERIA\", \"RBCUA\", \"BLOODU\", \"SPECGRAV\", \"GLUCOSEU\"    Radiology:  CT CHEST WO CONTRAST   Final Result   1. Partially collapsed cavitary lesion in the right upper lobe measuring 6.4   x 5.4 x 3.4 cm. The margins of the collapsed cavity are nodular. Similar   findings were observed on the CT of the cervical spine from 10/13/2022, when   the cavitary lesion was only partially included in the field view of the   study.  An infectious/inflammatory etiology is therefore favored over   malignancy.  However, as the study did not fully include the cavity, further   evaluation with PET scan may be considered.   2. Severe emphysematous changes in the lungs.   3. Probable pulmonary arterial hypertension.   4. Age indeterminate compression fracture deformities in the T1, T2, T6 and   T11 vertebral bodies.    If indicated, MRI may be obtained for further   evaluation.         XR CHEST (SINGLE VIEW FRONTAL)   Final Result   1. COPD.   2. Bilateral reticular prominence in both lungs.   3. No evidence of pleural fluid or pneumonia.                 Assessment/Plan:    74 y.o. male with PMH of severe COPD,Mycobacterium Xenopi cavitary lung disease,  NPH s/p  shunt, dementia, depression, who presented with:     Acute hypoxic/hypercapnic respiratory failure  - due to acute COPD exacerbation   - requiring 3 liters of O2 on arrival, down to 2 liters today  - CT chest showed chronic RUL cavitary lesion and severe emphysema  - sputum culture is growing normal respiratory liang per report  - continue Duonebs, Zithromax, Solumedrol  - followed by pulmonology     Elevated troponins  - in  the setting of acute respiratory illness  - ECG showed SR with no acute ischemic changes per report  - TTE showed  preserved LVEF  - followed by cardiology       Diet: ADULT DIET; Regular    Code Status: Full Code      Disposition - home when medically ready        Electronically signed by JONH BARCLAY MD on 8/18/2024 at 11:42 AM

## 2024-08-18 NOTE — CONSULTS
Infectious Disease     Patient Name: Claude Xie  Date: 8/18/2024  YOB: 1950  Medical Record Number: [id number]        Chief Complaint   Patient presents with    Fatigue     Frequent falls          History of Present Illness:   Patient 72-year-old male presenting with fatigue frequent falls at home.  Associated worsening shortness of breath chronic productive cough for 7 to 10 days.  Multiple falls at home hypoxic in presentation the 80s.  Patient has a longstanding history of severe COPD . b/l hearing sensorineural hearing loss  He has a history of mycobacterial lung disease . 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  Previous treatment regimen was as follows  # Azithromycin 250 mg daily 1/12/22-2/16/23  # Ethambutol 800 mg daily 1/19/22-3/9/22, stopped d/t concern for optic neuropathy  # Rifampin 600 mg daily 1/26/22-2/16/23  # Amikacin 800 mg MWF 3/4/22-12/21/22  # ARIKAYCE (amikacin liposomal inhalation) MWF 12/29/22-1/6/23, stopped d/t wheezes, restarted at 1/2 dose and then full dose three times weekly 1/19/23-2/9/23    Patient's last seen infectious disease [facility] it appears May 2023 was being monitored off antibiotics     CT chest 04/2023 w/ stable changes  CT chest 11/21/2023 1.  6.7 x 3.1 cm cavitary lesion involving the right upper lobe/apex, associated areas of bronchiectasis and internal soft tissue density nodularity, unchanged from the prior examination of 04/26/2023. .  Numerous bilateral pulmonary nodules are again identified, relatively   stable in appearance, as detailed above.     There is an office visit from pulmonary from April 2024 and said the patient was still smear positive.  Appears from this record there was still AFB cultures positive from May 2024 for Mycobacterium xenopi.  Appears the pulmonary office contacted infectious disease I am unclear if he had any sort of formal follow-up with  Allergies      History reviewed. No pertinent family history.      Physical Exam:     Blood pressure (!) 140/67, pulse 57, temperature 97.5 °F (36.4 °C), temperature source Oral, resp. rate 14, height 1.803 m (5' 11\"), weight 72.6 kg (160 lb), SpO2 98%.  General: Patient appears ok at the present time. NAD  Skin: no new rashes  HEENT:  Neck is supple, No subconjunctival hemorrhages, no oral exudates  Heart: S1 S2  Lungs: clear bilaterally   Abdomen: soft, ND, NTTP,   Back :no CVA tenderness  Extrem: No edema, non tender  Neuro exam: CN II-XII intact  Psych: cooperative    Labs:  I have reviewed all lab results by electronic record, including most recent CBC, metabolic panel, and pertinent abnormalities were addressed from an infectious disease perspective.  Trends are being monitored over time.   Lab Results   Component Value Date    WBC 7.8 08/18/2024    HGB 11.3 (L) 08/18/2024    HCT 34.8 (L) 08/18/2024    MCV 95.9 (H) 08/18/2024     08/18/2024     Lab Results   Component Value Date/Time     08/18/2024 07:48 AM    K 3.9 08/18/2024 07:48 AM     08/18/2024 07:48 AM    CO2 29 08/18/2024 07:48 AM    BUN 19 08/18/2024 07:48 AM    CREATININE 0.67 08/18/2024 07:48 AM    GLUCOSE 93 08/18/2024 07:48 AM    CALCIUM 8.9 08/18/2024 07:48 AM    LABGLOM >90.0 08/18/2024 07:48 AM    LABGLOM >60.0 12/22/2022 05:50 PM        Radiology:  I have reviewed imaging results per electronic record and most pertinent abnormalities are being addressed from an infectious disease standpoint.             ASSESSMENT:  Weakness falls  COPD exacerbation  Cavitary lung lesion  Mycobacterial lung disease      [facility] chart reviewed as above.  Appears he did complete a lengthy course of treatment with stability in imaging present over the last couple years.  Several months ago he did still have positive cultures.  This Mycobacterium species usually follows an chronic  indolent course in people with underlying chronic lung disease.  I

## 2024-08-19 PROBLEM — R79.89 ELEVATED TROPONIN: Status: ACTIVE | Noted: 2024-08-19

## 2024-08-19 PROBLEM — R06.02 SHORTNESS OF BREATH: Status: ACTIVE | Noted: 2024-08-19

## 2024-08-19 LAB
ALBUMIN SERPL-MCNC: 3.4 G/DL (ref 3.5–4.6)
ANION GAP SERPL CALCULATED.3IONS-SCNC: 8 MEQ/L (ref 9–15)
BACTERIA SPEC RESP CULT: ABNORMAL
BACTERIA SPEC RESP CULT: ABNORMAL
BASOPHILS # BLD: 0 K/UL (ref 0–0.2)
BASOPHILS NFR BLD: 0.3 %
BUN SERPL-MCNC: 17 MG/DL (ref 8–23)
CALCIUM SERPL-MCNC: 9.2 MG/DL (ref 8.5–9.9)
CHLORIDE SERPL-SCNC: 103 MEQ/L (ref 95–107)
CO2 SERPL-SCNC: 32 MEQ/L (ref 20–31)
CREAT SERPL-MCNC: 0.72 MG/DL (ref 0.7–1.2)
CRP SERPL HS-MCNC: 94.4 MG/L (ref 0–5)
EKG ATRIAL RATE: 96 BPM
EKG P AXIS: 77 DEGREES
EKG P-R INTERVAL: 154 MS
EKG Q-T INTERVAL: 396 MS
EKG QRS DURATION: 76 MS
EKG QTC CALCULATION (BAZETT): 500 MS
EKG R AXIS: 59 DEGREES
EKG T AXIS: 73 DEGREES
EKG VENTRICULAR RATE: 96 BPM
EOSINOPHIL # BLD: 0.2 K/UL (ref 0–0.7)
EOSINOPHIL NFR BLD: 2.1 %
ERYTHROCYTE [DISTWIDTH] IN BLOOD BY AUTOMATED COUNT: 14.3 % (ref 11.5–14.5)
GLUCOSE SERPL-MCNC: 90 MG/DL (ref 70–99)
HCT VFR BLD AUTO: 35.4 % (ref 42–52)
HGB BLD-MCNC: 11.6 G/DL (ref 14–18)
LYMPHOCYTES # BLD: 1.3 K/UL (ref 1–4.8)
LYMPHOCYTES NFR BLD: 14.9 %
MAGNESIUM SERPL-MCNC: 2.1 MG/DL (ref 1.7–2.4)
MCH RBC QN AUTO: 31.5 PG (ref 27–31.3)
MCHC RBC AUTO-ENTMCNC: 32.8 % (ref 33–37)
MCV RBC AUTO: 96.2 FL (ref 79–92.2)
MONOCYTES # BLD: 1 K/UL (ref 0.2–0.8)
MONOCYTES NFR BLD: 11 %
NEUTROPHILS # BLD: 6.4 K/UL (ref 1.4–6.5)
NEUTS SEG NFR BLD: 71.4 %
ORGANISM: ABNORMAL
PHOSPHATE SERPL-MCNC: 3.3 MG/DL (ref 2.3–4.8)
PLATELET # BLD AUTO: 301 K/UL (ref 130–400)
POTASSIUM SERPL-SCNC: 3.8 MEQ/L (ref 3.4–4.9)
PROCALCITONIN SERPL IA-MCNC: 0.14 NG/ML (ref 0–0.15)
RBC # BLD AUTO: 3.68 M/UL (ref 4.7–6.1)
SODIUM SERPL-SCNC: 143 MEQ/L (ref 135–144)
WBC # BLD AUTO: 9 K/UL (ref 4.8–10.8)

## 2024-08-19 PROCEDURE — 97116 GAIT TRAINING THERAPY: CPT

## 2024-08-19 PROCEDURE — 94761 N-INVAS EAR/PLS OXIMETRY MLT: CPT

## 2024-08-19 PROCEDURE — 94640 AIRWAY INHALATION TREATMENT: CPT

## 2024-08-19 PROCEDURE — 83735 ASSAY OF MAGNESIUM: CPT

## 2024-08-19 PROCEDURE — 2500000003 HC RX 250 WO HCPCS: Performed by: INTERNAL MEDICINE

## 2024-08-19 PROCEDURE — 2700000000 HC OXYGEN THERAPY PER DAY

## 2024-08-19 PROCEDURE — 6370000000 HC RX 637 (ALT 250 FOR IP): Performed by: INTERNAL MEDICINE

## 2024-08-19 PROCEDURE — 6360000002 HC RX W HCPCS: Performed by: INTERNAL MEDICINE

## 2024-08-19 PROCEDURE — 99213 OFFICE O/P EST LOW 20 MIN: CPT

## 2024-08-19 PROCEDURE — 87070 CULTURE OTHR SPECIMN AEROBIC: CPT

## 2024-08-19 PROCEDURE — 86140 C-REACTIVE PROTEIN: CPT

## 2024-08-19 PROCEDURE — 2580000003 HC RX 258: Performed by: INTERNAL MEDICINE

## 2024-08-19 PROCEDURE — 99232 SBSQ HOSP IP/OBS MODERATE 35: CPT | Performed by: INTERNAL MEDICINE

## 2024-08-19 PROCEDURE — 97535 SELF CARE MNGMENT TRAINING: CPT

## 2024-08-19 PROCEDURE — 36415 COLL VENOUS BLD VENIPUNCTURE: CPT

## 2024-08-19 PROCEDURE — 80069 RENAL FUNCTION PANEL: CPT

## 2024-08-19 PROCEDURE — 85025 COMPLETE CBC W/AUTO DIFF WBC: CPT

## 2024-08-19 PROCEDURE — 84145 PROCALCITONIN (PCT): CPT

## 2024-08-19 PROCEDURE — 1210000000 HC MED SURG R&B

## 2024-08-19 RX ADMIN — IPRATROPIUM BROMIDE AND ALBUTEROL SULFATE 1 DOSE: 2.5; .5 SOLUTION RESPIRATORY (INHALATION) at 20:14

## 2024-08-19 RX ADMIN — SUCRALFATE 1 G: 1 TABLET ORAL at 14:34

## 2024-08-19 RX ADMIN — CEFTRIAXONE SODIUM 1000 MG: 1 INJECTION, POWDER, FOR SOLUTION INTRAMUSCULAR; INTRAVENOUS at 14:40

## 2024-08-19 RX ADMIN — AZITHROMYCIN MONOHYDRATE 500 MG: 500 INJECTION, POWDER, LYOPHILIZED, FOR SOLUTION INTRAVENOUS at 09:56

## 2024-08-19 RX ADMIN — MIRTAZAPINE 15 MG: 30 TABLET, FILM COATED ORAL at 20:29

## 2024-08-19 RX ADMIN — GUAIFENESIN SYRUP AND DEXTROMETHORPHAN 5 ML: 100; 10 SYRUP ORAL at 14:36

## 2024-08-19 RX ADMIN — DOCUSATE SODIUM 100 MG: 100 CAPSULE, LIQUID FILLED ORAL at 20:30

## 2024-08-19 RX ADMIN — SUCRALFATE 1 G: 1 TABLET ORAL at 09:46

## 2024-08-19 RX ADMIN — Medication 10 ML: at 09:47

## 2024-08-19 RX ADMIN — DICYCLOMINE HYDROCHLORIDE 10 MG: 10 CAPSULE ORAL at 20:30

## 2024-08-19 RX ADMIN — METHYLPREDNISOLONE SODIUM SUCCINATE 40 MG: 40 INJECTION INTRAMUSCULAR; INTRAVENOUS at 09:46

## 2024-08-19 RX ADMIN — SODIUM CHLORIDE, PRESERVATIVE FREE 10 ML: 5 INJECTION INTRAVENOUS at 14:39

## 2024-08-19 RX ADMIN — DICYCLOMINE HYDROCHLORIDE 10 MG: 10 CAPSULE ORAL at 09:46

## 2024-08-19 RX ADMIN — SUCRALFATE 1 G: 1 TABLET ORAL at 20:29

## 2024-08-19 RX ADMIN — DICYCLOMINE HYDROCHLORIDE 10 MG: 10 CAPSULE ORAL at 14:34

## 2024-08-19 RX ADMIN — Medication 10 ML: at 20:30

## 2024-08-19 RX ADMIN — DOCUSATE SODIUM 100 MG: 100 CAPSULE, LIQUID FILLED ORAL at 09:46

## 2024-08-19 RX ADMIN — ENOXAPARIN SODIUM 40 MG: 100 INJECTION SUBCUTANEOUS at 09:47

## 2024-08-19 RX ADMIN — BUPROPION HYDROCHLORIDE 100 MG: 100 TABLET, FILM COATED, EXTENDED RELEASE ORAL at 09:46

## 2024-08-19 RX ADMIN — BUPROPION HYDROCHLORIDE 100 MG: 100 TABLET, FILM COATED, EXTENDED RELEASE ORAL at 20:30

## 2024-08-19 RX ADMIN — GUAIFENESIN SYRUP AND DEXTROMETHORPHAN 5 ML: 100; 10 SYRUP ORAL at 09:47

## 2024-08-19 RX ADMIN — IPRATROPIUM BROMIDE AND ALBUTEROL SULFATE 1 DOSE: 2.5; .5 SOLUTION RESPIRATORY (INHALATION) at 08:50

## 2024-08-19 NOTE — PROGRESS NOTES
Physical Therapy Med Surg Daily Treatment Note  Facility/Department: [address] MED SURG UNIT  Room: [place] [address]       NAME: Claude Xie  : 1950 (74 y.o.)  MRN: [id number]  CODE STATUS: Full Code    Date of Service: 2024    Patient Diagnosis(es): COPD (chronic obstructive pulmonary disease) ([facility]) [J44.9]  Elevated troponin [R79.89]  Chronic obstructive pulmonary disease, unspecified COPD type ([facility]) [J44.9]   Chief Complaint   Patient presents with    Fatigue     Frequent falls     Patient Active Problem List    Diagnosis Date Noted    COPD exacerbation ([facility]) 2024    COPD (chronic obstructive pulmonary disease) ([facility]) 2024             Restrictions:fall risk       SUBJECTIVE:   Subjective: \"I need to get out of this bed\"    Pain  Pain: denies pain    OBJECTIVE:   Orientation  Overall Orientation Status: Within Functional Limits  Cognition  Overall Cognitive Status: WFL    Bed Mobility Training  Bed Mobility Training: Yes  Overall Level of Assistance: Moderate assistance  Interventions: Safety awareness training;Verbal cues;Manual cues  Rolling: Stand-by assistance;Minimum assistance  Supine to Sit: Moderate assistance  Scooting: Minimum assistance    Transfer Training  Transfer Training: Yes  Overall Level of Assistance: Moderate assistance;Assist X2  Interventions: Manual cues;Verbal cues;Safety awareness training  Sit to Stand: Moderate assistance;Assist X2  Stand to Sit: Moderate assistance;Assist X2    Overall Level of Assistance: Moderate assistance;Assist X2  Distance (ft): 3 Feet  Assistive Device: Walker, rolling  Interventions: Manual cues;Verbal cues;Demonstration  Base of Support: Widened  Speed/Kath: Delayed  Step Length: Left shortened  Gait Abnormalities: Ataxic  Right Side Weight Bearing: As tolerated  Left Side Weight Bearing: As tolerated                              Vitals  SpO2: 91 %  O2 Device: Nasal cannula          ASSESSMENT pt oxygen 86-88 % at tx end. Placed pts  oxygen on 2 liters and he was able to bring sats up. Notified RN who said that respiratory had taken him off of oxygen this am. Pt with decreased safety with standing and ambulating to chair. Physical assist to guide him to chair safely. Pt states he felt better being up in the chair. Coughing profusely throughout tx.         Discharge Recommendations:  Patient would benefit from continued therapy after discharge         Goals  Short Term Goals  Short Term Goal 1: Sit EOB x 8 min with good balance  Short Term Goal 2: Stand with B UE support on FWW with Fair+ balance x2 min  Long Term Goals  Long Term Goal 1: Bed mobility with indep  Long Term Goal 2: Functional transfers with indep  Long Term Goal 3: Amb 50ft with indep  Long Term Goal 4: indep with HEP to improve LE strength and activity tolerance  Patient Goals   Patient Goals : to get stronger and go home    PLAN    General Plan: 1 time a day 3-6 times a week        AMPAC (6 CLICK) BASIC MOBILITY  AM-PAC Inpatient Mobility Raw Score : 11     Therapy Time   Individual   Time In  1030   Time Out 1053   Minutes 23   8 minutes gait/standing  15 minutes bed mob/transfers          Jane Pearl PTA, 08/19/24 at 1:00 PM         Definitions for assistance levels  Independent = pt does not require any physical supervision or assistance from another person for activity completion. Device may be needed.  Stand by assistance = pt requires verbal cues or instructions from another person, close to but not touching, to perform the activity  Minimal assistance= pt performs 75% or more of the activity; assistance is required to complete the activity  Moderate assistance= pt performs 50% of the activity; assistance is required to complete the activity  Maximal assistance = pt performs 25% of the activity; assistance is required to complete the activity  Dependent = pt requires total physical assistance to accomplish the task

## 2024-08-19 NOTE — PROGRESS NOTES
Hospitalist Progress Note      PCP: Sheila Hernandez MD    Date of Admission: 8/16/2024    Chief Complaint:  no acute events, afebrile, stable HD, remains on 2 L. Still reports persistent dyspnea and cough.     Medications:  Reviewed    Infusion Medications    sodium chloride       Scheduled Medications    cefTRIAXone (ROCEPHIN) IV  1,000 mg IntraVENous Q24H    arformoterol tartrate  15 mcg Nebulization BID RT    buPROPion  100 mg Oral BID    dicyclomine  10 mg Oral TID    docusate sodium  100 mg Oral BID    mirtazapine  15 mg Oral Nightly    sucralfate  1 g Oral TID    sodium chloride flush  5-40 mL IntraVENous 2 times per day    enoxaparin  40 mg SubCUTAneous Daily    methylPREDNISolone  40 mg IntraVENous Daily    ipratropium 0.5 mg-albuterol 2.5 mg  1 Dose Inhalation BID RT     PRN Meds: sodium chloride flush, sodium chloride, ondansetron **OR** ondansetron, polyethylene glycol, acetaminophen **OR** acetaminophen, guaiFENesin-dextromethorphan, ipratropium 0.5 mg-albuterol 2.5 mg      Intake/Output Summary (Last 24 hours) at 8/19/2024 1457  Last data filed at 8/19/2024 1439  Gross per 24 hour   Intake 1534.08 ml   Output 2300 ml   Net -765.92 ml       Exam:    /75   Pulse 79   Temp 97.5 °F (36.4 °C) (Oral)   Resp 18   Ht 1.803 m (5' 11\")   Wt 72.6 kg (160 lb)   SpO2 97%   BMI 22.32 kg/m²     General appearance: alert, cooperative  Lungs: diminished with faint wheezing bilaterally  Heart: S1/S2,RRR  Abdomen: soft, active BS  Extremities: no edema         Labs:   Recent Labs     08/17/24  0710 08/18/24  0748 08/19/24  0638   WBC 7.4 7.8 9.0   HGB 11.3* 11.3* 11.6*   HCT 33.6* 34.8* 35.4*    284 301     Recent Labs     08/17/24  0710 08/18/24  0748 08/19/24  0638    141 143   K 4.0 3.9 3.8    104 103   CO2 28 29 32*   BUN 24* 19 17   CREATININE 0.79 0.67* 0.72   CALCIUM 9.0 8.9 9.2   PHOS 3.5 2.8 3.3     No results for input(s): \"AST\", \"ALT\", \"BILIDIR\", \"BILITOT\", \"ALKPHOS\" in  LVEF  - followed by cardiology       Diet: ADULT DIET; Regular    Code Status: Full Code      Disposition - home when medically ready        Electronically signed by Andrew Camargo MD on 8/19/2024 at 2:57 PM

## 2024-08-19 NOTE — PROGRESS NOTES
08/18/24 2000   RT Protocol   History Pulmonary Disease 2   Respiratory pattern 0   Breath sounds 2   Cough 0   Indications for Bronchodilator Therapy Decreased or absent breath sounds   Bronchodilator Assessment Score 4

## 2024-08-19 NOTE — PROGRESS NOTES
INPATIENT PROGRESS NOTES    PATIENT NAME: Claude Xie  MRN: [id number]  SERVICE DATE:  August 19, 2024   SERVICE TIME:  5:56 PM      PRIMARY SERVICE: Pulmonary Disease    CHIEF COMPLAIN: COPD exacerbation      INTERVAL HPI: Patient seen and examined at bedside, Interval Notes, orders reviewed. Nursing notes noted  Patient is feeling better currently on 2 L O2 via nasal cannula O2 saturation 97% does not have oxygen at home.  Currently on Zithromax and prednisone.  He is also on scheduled nebulizer treatment.  He was found having cavitary lesion in the right upper lobe which has been present since 2022.  He has been following With pulmonologist at [facility].  He denies having any chest pain pleuritic pain.  No fever or chills.  No nausea vomiting diarrhea.         OBJECTIVE   I/O:24HR INTAKE/OUTPUT:    Intake/Output Summary (Last 24 hours) at 8/19/2024 1756  Last data filed at 8/19/2024 1527  Gross per 24 hour   Intake 1581.75 ml   Output 2300 ml   Net -718.25 ml     08/18 0701 - 08/19 0700  In: 730 [P.O.:720; I.V.:10]  Out: 2200 [Urine:2200]  Body mass index is 22.32 kg/m².    PHYSICAL EXAM:  Vitals:  /75   Pulse 79   Temp 97.5 °F (36.4 °C) (Oral)   Resp 18   Ht 1.803 m (5' 11\")   Wt 72.6 kg (160 lb)   SpO2 97%   BMI 22.32 kg/m²     General: Alert, awake .comfortable in bed, No distress.  Head: Atraumatic , Normocephalic   Eyes: PERRL. No sclera icterus. No conjunctival injection. No discharge   ENT: No nasal  discharge. Pharynx clear.  Neck:  Trachea midline. No thyromegaly, no JVD, No cervical adenopathy.  Chest : Bilaterally symmetrical ,Normal effort,  No accessory muscle use  Lung : Diminished breath sound bilaterally No Rales. No wheezing. No rhonchi.   Heart:: Normal rate. Regular rhythm. No mumur ,  Rub or gallop  ABD: Non-tender. Non-distended. No masses. No organmegaly. Normal bowel sounds. No hernia.  Ext : No Pitting both leg , No Cyanosis No clubbing  Neuro: no focal weakness    Labs:  Recent  TECHNIQUE: CT of the chest was performed without the administration of intravenous contrast. Multiplanar reformatted images are provided for review. Automated exposure control, iterative reconstruction, and/or weight based adjustment of the mA/kV was utilized to reduce the radiation dose to as low as reasonably achievable. COMPARISON: None HISTORY: ORDERING SYSTEM PROVIDED HISTORY: history of cavitary lung disease TECHNOLOGIST PROVIDED HISTORY: Reason for exam:->history of cavitary lung disease What reading provider will be dictating this exam?->CRC FINDINGS: Mediastinum: The heart is normal in size.  Mild calcified coronary atherosclerosis.  Minimal atherosclerosis is seen in the aorta.  No aneurysm. Probable pulmonary arterial hypertension, with dilation of the main pulmonary artery (normal caliber less than 3 cm) to 3.3 cm.  No lymphadenopathy Lungs/pleura: Severe emphysematous changes in the lungs.  Right upper lobe scarring with associated traction bronchiectasis.  There is a partially collapsed cavitary lesion in the posterior aspect of the right upper lobe. It measures approximately 6.4 x 5.4 x 3.4 cm.  The margins of the collapsed cavity is nodular.  Similar findings were observed on the CT of the cervical spine from 10/13/2022, when the cavitary lesion was only partially included in the field view of the study.  Small peripheral pulmonary opacities in the lower lobes may represent atelectasis or scarring. Upper Abdomen: No acute abnormalities in the visualized upper abdomen.  Small calcified granuloma in the spleen. Soft Tissues/Bones: There are age indeterminate compression fracture deformities in the T1, T2 T6 and T11 vertebral bodies.     1. Partially collapsed cavitary lesion in the right upper lobe measuring 6.4 x 5.4 x 3.4 cm. The margins of the collapsed cavity are nodular. Similar findings were observed on the CT of the cervical spine from 10/13/2022, when the cavitary lesion was only partially

## 2024-08-19 NOTE — PLAN OF CARE
Problem: Skin/Tissue Integrity  Goal: Absence of new skin breakdown  Description: 1.  Monitor for areas of redness and/or skin breakdown  2.  Assess vascular access sites hourly  3.  Every 4-6 hours minimum:  Change oxygen saturation probe site  4.  Every 4-6 hours:  If on nasal continuous positive airway pressure, respiratory therapy assess nares and determine need for appliance change or resting period.  Outcome: Progressing     Problem: Safety - Adult  Goal: Free from fall injury  Flowsheets (Taken 8/18/2024 2103)  Free From Fall Injury:   Instruct family/caregiver on patient safety   Based on caregiver fall risk screen, instruct family/caregiver to ask for assistance with transferring infant if caregiver noted to have fall risk factors

## 2024-08-19 NOTE — PROGRESS NOTES
Wound Ostomy Continence Nurse  Consult Note       NAME:  Claude Xie  MEDICAL RECORD NUMBER:  [id number]  AGE: 74 y.o.   GENDER: male  : 1950  TODAY'S DATE:  2024    Subjective   Reason for WOC Nurse Evaluation and Assessment: left arm skin tear      Claude Xie is a 74 y.o. male referred by:   [] Physician  [x] Nursing  [] Other:     Wound Identification:  Wound Type: traumatic  Contributing Factors:  s/p fall     Wound History: Patient admitted to [facility] on 2024 with traumatic wound to left arm from fall at home.   Current Wound Care Treatment:  recommending 1) continue pressure injury prevention interventions 2) daily xeroform dressing to left arm     Patient Goal of Care:  [x] Wound Healing  [] Odor Control  [] Palliative Care  [] Pain Control   [] Other:         PAST MEDICAL HISTORY    History reviewed. No pertinent past medical history.    PAST SURGICAL HISTORY    History reviewed. No pertinent surgical history.    FAMILY HISTORY    History reviewed. No pertinent family history.    SOCIAL HISTORY    Social History     Tobacco Use    Smoking status: Former     Types: Cigarettes    Smokeless tobacco: Never    Tobacco comments:     Quit 6mo ago   Vaping Use    Vaping status: Never Used   Substance Use Topics    Alcohol use: Yes     Comment: Occassionally    Drug use: Never       ALLERGIES    No Known Allergies    MEDICATIONS    No current facility-administered medications on file prior to encounter.     Current Outpatient Medications on File Prior to Encounter   Medication Sig Dispense Refill    sucralfate (CARAFATE) 1 GM tablet Take 1 tablet by mouth 3 times daily      Multiple Vitamins-Minerals (ONE-A-DAY 50 PLUS PO) Take 1 tablet by mouth daily (with breakfast)      mirtazapine (REMERON) 15 MG tablet Take 1 tablet by mouth nightly      docusate (COLACE, DULCOLAX) 100 MG CAPS Take 100 mg by mouth 2 times daily      dicyclomine (BENTYL) 10 MG capsule Take 1 capsule by mouth 3

## 2024-08-19 NOTE — PROGRESS NOTES
08/19/24 0900   RT Protocol   History Pulmonary Disease 2   Respiratory pattern 0   Breath sounds 4   Cough 0   Indications for Bronchodilator Therapy Wheezing associated with pulm disorder   Bronchodilator Assessment Score 6

## 2024-08-19 NOTE — CARE COORDINATION
Definition of COPD discussed.   Lung function explained.   Types of COPD differentiated for patient.   Symptoms discussed including: difficulty breathing, SOB, coughing, excess mucous, weakness and fatigue, or wheezing.   Causes discussed.   Pt quit smoking, and has been exposed to air pollution or dust.   Different testing for COPD and most common treatments reviewed.  Importance of preventing infection including hand hygiene, keeping inhaler mouthpieces clean, and getting the flu and pneumonia vaccinations.  Care Map of what to expect while hospitalized reviewed with patient.  Ways to ease SOB explained including pursed lip breathing and diaphragmatic breathing.  Energy conservation discussed.  Possible medications that may be ordered were reviewed. I stressed that their physician would make that decision and explained the importance of taking the medication as directed.   Good nutrition choices discussed. Pt denies losing weight.   COPD booklet and zone pamphlet left for patient review.   Patient denies any further questions at this time.   Electronically signed by Jeff Liu RN on 8/19/2024 at 9:23 AM

## 2024-08-19 NOTE — PROGRESS NOTES
Infectious Diseases Inpatient Progress Note          HISTORY OF PRESENT ILLNESS:  Follow up bacterial pneumonia superimposed on history of mycobacterial infection and cavitary nodule of right upper lobe, acute respiratory failure on admission on no current antibiotics.  Positive frequent cough, moist and nonproductive.   No significant shortness of breath.   Positive generalized weakness.   Decreased appetite since he does not have the denture.  He reported that his denture was found broken after admission  No fevers  Current Medications:     arformoterol tartrate  15 mcg Nebulization BID RT    buPROPion  100 mg Oral BID    dicyclomine  10 mg Oral TID    docusate sodium  100 mg Oral BID    mirtazapine  15 mg Oral Nightly    sucralfate  1 g Oral TID    sodium chloride flush  5-40 mL IntraVENous 2 times per day    enoxaparin  40 mg SubCUTAneous Daily    methylPREDNISolone  40 mg IntraVENous Daily    ipratropium 0.5 mg-albuterol 2.5 mg  1 Dose Inhalation BID RT       Allergies:  Patient has no known allergies.      Review of Systems  Rest of system review is negative other than HPI    Physical Exam  Vitals:    08/19/24 0214 08/19/24 0723 08/19/24 0850 08/19/24 1255   BP: 128/73 137/72     Pulse: 58 66     Resp: 14 18     Temp: 98.2 °F (36.8 °C) 98.4 °F (36.9 °C)     TempSrc: Oral Oral     SpO2: 95% 99% 95% 91%   Weight:       Height:         General Appearance: alert and oriented, in no acute distress  On 2 L nasal cannula  Skin: warm and dry, no rash.   Head: normocephalic and atraumatic  Eyes: anicteric sclerae  ENT: oropharynx clear and moist with normal mucous membranes. No oral thrush  Lungs: normal respiratory effort, bilateral scattered rhonchi with wheezing  Heart normal S1-S2  Abdomen: soft, no tenderness  No leg edema  No erythema, no tenderness      DATA:    Lab Results   Component Value Date    WBC 9.0 08/19/2024    HGB 11.6 (L) 08/19/2024    HCT 35.4 (L) 08/19/2024    MCV 96.2 (H) 08/19/2024      08/19/2024     Lab Results   Component Value Date    CREATININE 0.72 08/19/2024    BUN 17 08/19/2024     08/19/2024    K 3.8 08/19/2024     08/19/2024    CO2 32 (H) 08/19/2024     Collected: 08/19/24 0638 Updated: 08/19/24 0749 Specimen Type: Blood CRP94.4 High     CULTURE, RESPIRATORY-- Abnormal  Direct Exam:  < 10 EPITHELIAL CELLS/LPF   Direct Exam:  >10, <25 NEUTROPHILS/LPF   Direct Exam:  MIXED BACTERIAL MORPHOTYPES SEEN ON GRAM STAIN.   Cult,Respiratory:  NORMAL RESPIRATORY LORRI   Cult,Respiratory:  MODERATE GROWTH   Performed at [address] [address] ([phone]    Abnormal  OrganismHaemophilus influenzae Abnormal  CULTURE, RESPIRATORY-- HEAVY GROWTH   BETA LACTAMASE POSITIVE   Identification by MALDI-TOF   Narrative:    CT of the chest was reviewed  IMPRESSION:  1. Partially collapsed cavitary lesion in the right upper lobe measuring 6.4  x 5.4 x 3.4 cm. The margins of the collapsed cavity are nodular. Similar  findings were observed on the CT of the cervical spine from 10/13/2022, when  the cavitary lesion was only partially included in the field view of the  study.  An infectious/inflammatory etiology is therefore favored over  malignancy.  However, as the study did not fully include the cavity, further  evaluation with PET scan may be considered.  2. Severe emphysematous changes in the lungs.  3. Probable pulmonary arterial hypertension.  4. Age indeterminate compression fracture deformities in the T1, T2, T6 and  T11 vertebral bodies.    If indicated, MRI may be obtained for further  evaluation.              Exam Ended: 08/16/24 19:32 EDT Last Resulted: 08/16/24 21:59 EDT        IMPRESSION:    Haemophilus influenza pneumonia  Cavitary lung lesion of right upper lobe, could be secondary to history of mycobacterial infection  Acute COPD exacerbation with acute respiratory failure/severe emphysema      PLAN:  Start IV Rocephin  Switch to oral antibiotics on  discharge  Follow-up with pulmonary  Oxygen support  Patient needs assistance in getting his dentures glued up since it broke during this admission    Discussed with patient and     Micki Rodriguez MD

## 2024-08-19 NOTE — CARE COORDINATION
SPOKE W/PT AND CONTACTED DTR TO DISCUSS PT RECOMMENDATION OF REHAB. DTR WOULD LIKE [facility]. REFERRAL FAXED. CM/LSW TO FOLLOW FOR ACCEPTANCE. PRECERT NEEDED FOR TRANSFER.

## 2024-08-20 PROBLEM — I21.4 NSTEMI (NON-ST ELEVATED MYOCARDIAL INFARCTION) (HCC): Status: ACTIVE | Noted: 2024-08-20

## 2024-08-20 LAB — CRP SERPL HS-MCNC: 83.8 MG/L (ref 0–5)

## 2024-08-20 PROCEDURE — 36415 COLL VENOUS BLD VENIPUNCTURE: CPT

## 2024-08-20 PROCEDURE — 2500000003 HC RX 250 WO HCPCS: Performed by: INTERNAL MEDICINE

## 2024-08-20 PROCEDURE — 6370000000 HC RX 637 (ALT 250 FOR IP): Performed by: INTERNAL MEDICINE

## 2024-08-20 PROCEDURE — 94640 AIRWAY INHALATION TREATMENT: CPT

## 2024-08-20 PROCEDURE — 94760 N-INVAS EAR/PLS OXIMETRY 1: CPT

## 2024-08-20 PROCEDURE — 6360000002 HC RX W HCPCS: Performed by: INTERNAL MEDICINE

## 2024-08-20 PROCEDURE — 2580000003 HC RX 258: Performed by: INTERNAL MEDICINE

## 2024-08-20 PROCEDURE — 97535 SELF CARE MNGMENT TRAINING: CPT

## 2024-08-20 PROCEDURE — 86140 C-REACTIVE PROTEIN: CPT

## 2024-08-20 PROCEDURE — 1210000000 HC MED SURG R&B

## 2024-08-20 PROCEDURE — 99232 SBSQ HOSP IP/OBS MODERATE 35: CPT | Performed by: INTERNAL MEDICINE

## 2024-08-20 PROCEDURE — 2700000000 HC OXYGEN THERAPY PER DAY

## 2024-08-20 RX ORDER — CEFUROXIME AXETIL 500 MG/1
500 TABLET ORAL 2 TIMES DAILY
Qty: 14 TABLET | Refills: 0
Start: 2024-08-20 | End: 2024-08-23

## 2024-08-20 RX ORDER — IPRATROPIUM BROMIDE AND ALBUTEROL SULFATE 2.5; .5 MG/3ML; MG/3ML
3 SOLUTION RESPIRATORY (INHALATION) EVERY 4 HOURS PRN
DISCHARGE
Start: 2024-08-20

## 2024-08-20 RX ORDER — PREDNISONE 20 MG/1
40 TABLET ORAL DAILY
DISCHARGE
Start: 2024-08-21 | End: 2024-08-22

## 2024-08-20 RX ORDER — GUAIFENESIN/DEXTROMETHORPHAN 100-10MG/5
5 SYRUP ORAL EVERY 4 HOURS PRN
DISCHARGE
Start: 2024-08-20 | End: 2024-08-30

## 2024-08-20 RX ADMIN — Medication 10 ML: at 20:29

## 2024-08-20 RX ADMIN — CEFTRIAXONE SODIUM 1000 MG: 1 INJECTION, POWDER, FOR SOLUTION INTRAMUSCULAR; INTRAVENOUS at 14:45

## 2024-08-20 RX ADMIN — DICYCLOMINE HYDROCHLORIDE 10 MG: 10 CAPSULE ORAL at 09:31

## 2024-08-20 RX ADMIN — SUCRALFATE 1 G: 1 TABLET ORAL at 14:41

## 2024-08-20 RX ADMIN — DOCUSATE SODIUM 100 MG: 100 CAPSULE, LIQUID FILLED ORAL at 09:31

## 2024-08-20 RX ADMIN — BUPROPION HYDROCHLORIDE 100 MG: 100 TABLET, FILM COATED, EXTENDED RELEASE ORAL at 20:29

## 2024-08-20 RX ADMIN — ENOXAPARIN SODIUM 40 MG: 100 INJECTION SUBCUTANEOUS at 09:31

## 2024-08-20 RX ADMIN — MIRTAZAPINE 15 MG: 30 TABLET, FILM COATED ORAL at 20:28

## 2024-08-20 RX ADMIN — DOCUSATE SODIUM 100 MG: 100 CAPSULE, LIQUID FILLED ORAL at 20:29

## 2024-08-20 RX ADMIN — Medication 10 ML: at 09:00

## 2024-08-20 RX ADMIN — IPRATROPIUM BROMIDE AND ALBUTEROL SULFATE 1 DOSE: 2.5; .5 SOLUTION RESPIRATORY (INHALATION) at 07:29

## 2024-08-20 RX ADMIN — METHYLPREDNISOLONE SODIUM SUCCINATE 40 MG: 40 INJECTION INTRAMUSCULAR; INTRAVENOUS at 09:31

## 2024-08-20 RX ADMIN — DICYCLOMINE HYDROCHLORIDE 10 MG: 10 CAPSULE ORAL at 20:28

## 2024-08-20 RX ADMIN — IPRATROPIUM BROMIDE AND ALBUTEROL SULFATE 1 DOSE: 2.5; .5 SOLUTION RESPIRATORY (INHALATION) at 20:32

## 2024-08-20 RX ADMIN — BUPROPION HYDROCHLORIDE 100 MG: 100 TABLET, FILM COATED, EXTENDED RELEASE ORAL at 09:30

## 2024-08-20 RX ADMIN — GUAIFENESIN SYRUP AND DEXTROMETHORPHAN 5 ML: 100; 10 SYRUP ORAL at 14:41

## 2024-08-20 RX ADMIN — SUCRALFATE 1 G: 1 TABLET ORAL at 09:30

## 2024-08-20 RX ADMIN — GUAIFENESIN SYRUP AND DEXTROMETHORPHAN 5 ML: 100; 10 SYRUP ORAL at 18:24

## 2024-08-20 RX ADMIN — SUCRALFATE 1 G: 1 TABLET ORAL at 20:28

## 2024-08-20 RX ADMIN — DICYCLOMINE HYDROCHLORIDE 10 MG: 10 CAPSULE ORAL at 14:41

## 2024-08-20 NOTE — PROGRESS NOTES
INPATIENT PROGRESS NOTES    PATIENT NAME: Claude Xie  MRN: [id number]  SERVICE DATE:  August 20, 2024   SERVICE TIME:  4:24 PM      PRIMARY SERVICE: Pulmonary Disease    CHIEF COMPLAIN: COPD exacerbation      INTERVAL HPI: Patient seen and examined at bedside, Interval Notes, orders reviewed. Nursing notes noted  Patient is feeling better currently on 2 L O2 via nasal cannula O2 saturation 96%   Currently on Zithromax and prednisone.  He is also on scheduled nebulizer treatment.  He was found having cavitary lesion in the right upper lobe which has been present since 2022.  He has been following With pulmonologist at [facility].  He is having cough with some mucus.  He denies having any chest pain pleuritic pain.  No fever or chills.  No nausea vomiting diarrhea.         OBJECTIVE   I/O:24HR INTAKE/OUTPUT:    Intake/Output Summary (Last 24 hours) at 8/20/2024 1624  Last data filed at 8/20/2024 0937  Gross per 24 hour   Intake 600 ml   Output 200 ml   Net 400 ml     08/19 0701 - 08/20 0700  In: 1581.8 [P.O.:720; I.V.:30]  Out: 300 [Urine:300]  Body mass index is 22.32 kg/m².    PHYSICAL EXAM:  Vitals:  /84   Pulse 91   Temp 98.1 °F (36.7 °C) (Oral)   Resp 18   Ht 1.803 m (5' 11\")   Wt 72.6 kg (160 lb)   SpO2 96%   BMI 22.32 kg/m²     General: Alert, awake .comfortable in bed, No distress.  Head: Atraumatic , Normocephalic   Eyes: PERRL. No sclera icterus. No conjunctival injection. No discharge   ENT: No nasal  discharge. Pharynx clear.  Neck:  Trachea midline. No thyromegaly, no JVD, No cervical adenopathy.  Chest : Bilaterally symmetrical ,Normal effort,  No accessory muscle use  Lung : Diminished breath sound bilaterally No Rales. No wheezing. No rhonchi.   Heart:: Normal rate. Regular rhythm. No mumur ,  Rub or gallop  ABD: Non-tender. Non-distended. No masses. No organmegaly. Normal bowel sounds. No hernia.  Ext : No Pitting both leg , No Cyanosis No clubbing  Neuro: no focal weakness    Labs:  Recent  etiology is therefore favored over malignancy.  However, as the study did not fully include the cavity, further evaluation with PET scan may be considered. 2. Severe emphysematous changes in the lungs. 3. Probable pulmonary arterial hypertension. 4. Age indeterminate compression fracture deformities in the T1, T2, T6 and T11 vertebral bodies.    If indicated, MRI may be obtained for further evaluation.     XR CHEST (SINGLE VIEW FRONTAL)    Result Date: 8/16/2024  EXAMINATION: ONE XRAY VIEW OF THE CHEST 8/16/2024 1:13 pm COMPARISON: None. HISTORY: ORDERING SYSTEM PROVIDED HISTORY: shortness of breath, coughing sputum TECHNOLOGIST PROVIDED HISTORY: Reason for exam:->shortness of breath, coughing sputum What reading provider will be dictating this exam?->CRC FINDINGS: Lung volumes are increased compatible with COPD.  There is apical pleural thickening with scarring and retraction of the stan superiorly.  Mild bilateral reticular prominence in both lungs.  No evidence of pleural fluid or pneumonia.  Heart is normal in size.  A linear tubular device projects over the right mediastinum into the lower right neck which represents the  shunt tube.  Visualized portions of the tube appear intact.     1. COPD. 2. Bilateral reticular prominence in both lungs. 3. No evidence of pleural fluid or pneumonia.     IMPRESSION AND SUGGESTION:  COPD exacerbation  Cavitary lesion in right upper lobe, since 2022    Continue O2 to keep saturation 90% or above.  Continue Zithromax and prednisone.  Culture on 8/16/2024 no growth.  Repeat culture on 8/17/24 is pending.  Procalcitonin is 0.14.  Patient had cavitary lesion in right upper lobe since October 2022.  Patient is following at [facility].  And recommend to follow with [facility] pulmonologist after discharge.  PET scan may be considered.    NOTE: This report was transcribed using voice recognition software. Every effort was made to ensure accuracy; however, inadvertent computerized transcription

## 2024-08-20 NOTE — DISCHARGE SUMMARY
Hospital Medicine Discharge Summary    Claude Xie  :  1950  MRN:  [id number]    Admit date:  2024  Discharge date:  2024    Admitting Physician:  Tomas Lee MD  Primary Care Physician:  Sheila Hernandez MD      Chief Complaint   Patient presents with    Fatigue     Frequent falls     Hospital Course:     74 y.o. male with PMH of severe COPD,Mycobacterium Xenopi cavitary lung disease,  NPH s/p  shunt, dementia, depression whop resented with weakness, multiple falls, and worsening of his baseline cough. He was hypoxic on presentation and treated with steroids and duonebs with some improvement. Lower respiratory cx grew Haemophilus influenzae for which he was started on rocephin and will complete another 7d of Ceftin at discharge per ID recommendations. He can follow up closely with his pulmonologist at [facility] after discharge. He was discharged to SNF in stable condition requiring 2 L O2 which can be weaned further as tolerated at his SNF.     Exam on discharge:   /84   Pulse 91   Temp 98.1 °F (36.7 °C) (Oral)   Resp 18   Ht 1.803 m (5' 11\")   Wt 72.6 kg (160 lb)   SpO2 96%   BMI 22.32 kg/m²   General appearance: No apparent distress, appears stated age and cooperative.  HEENT: Pupils equal, round, and reactive to light. Conjunctivae/corneas clear.  Neck: Supple, with full range of motion. No jugular venous distention. Trachea midline.  Respiratory:  Normal respiratory effort. Clear to auscultation, bilaterally without Rales/Wheezes/Rhonchi.  Cardiovascular: Regular rate and rhythm with normal S1/S2 without murmurs, rubs or gallops.  Abdomen: Soft, non-tender, non-distended with normal bowel sounds.  Musculoskeletal: No clubbing, cyanosis or edema bilaterally.  Full range of motion without deformity.  Skin: Skin color, texture, turgor normal.  No rashes or lesions.  Neuro: Non Focal. Symetrical motor and tone. Nl Comprehension, Alert,awake and oriented. NL CN. Symetrical tone  partially collapsed cavitary lesion in the posterior aspect of the right upper lobe. It measures approximately 6.4 x 5.4 x 3.4 cm.  The margins of the collapsed cavity is nodular.  Similar findings were observed on the CT of the cervical spine from 10/13/2022, when the cavitary lesion was only partially included in the field view of the study.  Small peripheral pulmonary opacities in the lower lobes may represent atelectasis or scarring. Upper Abdomen: No acute abnormalities in the visualized upper abdomen.  Small calcified granuloma in the spleen. Soft Tissues/Bones: There are age indeterminate compression fracture deformities in the T1, T2 T6 and T11 vertebral bodies.     1. Partially collapsed cavitary lesion in the right upper lobe measuring 6.4 x 5.4 x 3.4 cm. The margins of the collapsed cavity are nodular. Similar findings were observed on the CT of the cervical spine from 10/13/2022, when the cavitary lesion was only partially included in the field view of the study.  An infectious/inflammatory etiology is therefore favored over malignancy.  However, as the study did not fully include the cavity, further evaluation with PET scan may be considered. 2. Severe emphysematous changes in the lungs. 3. Probable pulmonary arterial hypertension. 4. Age indeterminate compression fracture deformities in the T1, T2, T6 and T11 vertebral bodies.    If indicated, MRI may be obtained for further evaluation.     XR CHEST (SINGLE VIEW FRONTAL)    Result Date: 8/16/2024  EXAMINATION: ONE XRAY VIEW OF THE CHEST 8/16/2024 1:13 pm COMPARISON: None. HISTORY: ORDERING SYSTEM PROVIDED HISTORY: shortness of breath, coughing sputum TECHNOLOGIST PROVIDED HISTORY: Reason for exam:->shortness of breath, coughing sputum What reading provider will be dictating this exam?->CRC FINDINGS: Lung volumes are increased compatible with COPD.  There is apical pleural thickening with scarring and retraction of the stan superiorly.  Mild bilateral

## 2024-08-20 NOTE — CARE COORDINATION
SPOKE W/ISRA FROM [facility] AND THEY HAVE ACCEPTED PT.   SPOKE W/DTR WHO INFORMED THEY WANTED TO [facility]  IN [place] NOT [place]. INFORMED SANG AND DR. RINCON. LSW TO FOLLOW. REFERRAL FAXED TO N[facility]DO IN ADMISSIONS AT [facility], INCLUDED AVS.

## 2024-08-20 NOTE — CARE COORDINATION
PER ID, PT WILL SWITCH TO [facility] UPON DISCHARGE. [facility] TO [facility]QUIRE ON ACCEPTANCE. VM MESSAGE LEFT FOR LIAISON ELLIOTT. CM/LSW TO FOLLOW.   1235 SPOKE W/ELLIOTT FROM [facility] AND THEY ARE UNABLE TO TAKE. PT NEEDS SNF. WILL DISCUSS WITH LSW AND DTR. WILL FOLLOW.   1349- SPOKE W/DTR AND SHE WOULD LIKE [facility] AS FIRST CHOICE AND [facility]. CM/LSW TO FOLLOW.

## 2024-08-20 NOTE — CARE COORDINATION
REFERRAL SENT TO [facility] IN ADMISSIONS AT [facility] IN [place]. LSW/CM TO FOLLOW FOR ACCEPTANCE. NO PRECERT.

## 2024-08-20 NOTE — PROGRESS NOTES
Cardiology progress note    Patient Name: Claude Xie  Admit Date: 2024 12:34 PM  MR #: [id number]  : 1950    Attending Physician: Andrew Camargo MD  Reason for consult: Elevated troponins    History of Presenting Illness:      Claude Xie is a 74 y.o. male on hospital day 4 with a history of COPD, lung disease apparent the Mycobacterium infection which seems chronic, NPH status post  shunt, dementia, hypertension, hyperlipidemia, who was admitted to the hospital after sustaining a fall. History Obtained From:  patient, electronic medical record    In the ER patient was found hypoxic  CT of the chest consistent with inflammatory, infectious process, severe emphysema  Troponins elevated at 100 trended down to 87  EKG sinus rhythm without obvious signs of ischemia  ==============  Hospital course  2024  Patient laying in bed comfortably  Denies chest pain  Denies shortness of breath, patient still coughing, productive cough  Telemetry sinus rhythm    2024  Patient laying in bed looks comfortable  Denies chest pain  Still having productive cough  Daughter and son-in-law at bedside    History:      History reviewed. No pertinent past medical history.  History reviewed. No pertinent surgical history.  Family History  History reviewed. No pertinent family history.  [] Unable to obtain due to ventilated and/ or neurologic status  Social History     Socioeconomic History    Marital status:      Spouse name: Not on file    Number of children: Not on file    Years of education: Not on file    Highest education level: Not on file   Occupational History    Not on file   Tobacco Use    Smoking status: Former     Types: Cigarettes    Smokeless tobacco: Never    Tobacco comments:     Quit 6mo ago   Vaping Use    Vaping status: Never Used   Substance and Sexual Activity    Alcohol use: Yes     Comment: Occassionally    Drug use: Never    Sexual activity: Not Currently   Other Topics Concern     other than listed in HPI.         Objective Findings:     Vitals:   Vitals:    08/19/24 1255 08/19/24 1437 08/19/24 1913 08/19/24 2015   BP:  138/75 128/67    Pulse:  79 77    Resp:  18 18    Temp:  97.5 °F (36.4 °C) 98.2 °F (36.8 °C)    TempSrc:  Oral Oral    SpO2: 91% 97% 95% 96%   Weight:       Height:            Physical Examination:  General: Alert oriented x4 no acute distress  HEENT: Normocephalic, No scleral icterus.  Neck: No JVD.  Heart: Regular, no murmur, no rub/gallop. No RV heave.  Lungs: Clear to ascultation, no rales/wheezing/rhonchi. Good chest wall excursion.  Abdomen: Soft non tender non distended   extremities: No clubbing/cyanosis, no edema.   Skin: Warm, dry, normal turgor, no rash, no bruise, no petichiae.  Neuro: No myoclonus or tremor.   Psych: Normal affect    Results/ Medications reviewed 8/20/2024, 8:02 AM     Laboratory, Microbiology, Pathology, Radiology, Cardiology, Medications and Transcriptions reviewed  Scheduled Meds:   cefTRIAXone (ROCEPHIN) IV  1,000 mg IntraVENous Q24H    arformoterol tartrate  15 mcg Nebulization BID RT    buPROPion  100 mg Oral BID    dicyclomine  10 mg Oral TID    docusate sodium  100 mg Oral BID    mirtazapine  15 mg Oral Nightly    sucralfate  1 g Oral TID    sodium chloride flush  5-40 mL IntraVENous 2 times per day    enoxaparin  40 mg SubCUTAneous Daily    methylPREDNISolone  40 mg IntraVENous Daily    ipratropium 0.5 mg-albuterol 2.5 mg  1 Dose Inhalation BID RT     Continuous Infusions:   sodium chloride         Recent Labs     08/18/24  0748 08/19/24  0638   WBC 7.8 9.0   HGB 11.3* 11.6*   HCT 34.8* 35.4*   MCV 95.9* 96.2*    301     Recent Labs     08/18/24  0748 08/19/24  0638    143   K 3.9 3.8    103   CO2 29 32*   PHOS 2.8 3.3   BUN 19 17   CREATININE 0.67* 0.72     No results for input(s): \"INR\" in the last 72 hours.    Invalid input(s): \"PROT\"  No results found for this or any previous visit.       Impression:     -Elevated  troponins, troponins peaked at 100  Possible demand ischemia in the setting of ongoing COPD exacerbation/pneumonia, possible underlying CAD  TTE 8/17/2024 EF 65 to 70%  Ischemic evaluation as an outpatient  Continue aspirin and atorvastatin for presumptive CAD    Other medical problems:  COPD exacerbation  NPH status post  shunt  Hypertension  Hyperlipidemia      Comments:     Thank you for allowing us to participate in the care of this patient.     Will continue to follow.    Please call if questions or concerns arise.  Of note:  Patient was examined and evaluated on 8/19/2024  This note was signed on 8/20/2024    Electronically signed by Epi Lim MD on 8/20/2024 at 8:02 AM    Please note this report has been partially produced using speech recognition software and may cause contain errors related to that system including grammar, punctuation and spelling as well as words and phrases that may seem inappropriate. If there are questions or concerns please feel free to contact me to clarify.

## 2024-08-20 NOTE — PROGRESS NOTES
Physical Therapy Med Surg Daily Treatment Note  Facility/Department: [address] MED SURG UNIT  Room: [place] [id number]       NAME: Claude Xie  : 1950 (74 y.o.)  MRN: [id number]  CODE STATUS: Full Code    Date of Service: 2024    Patient Diagnosis(es): COPD (chronic obstructive pulmonary disease) ([facility]) [J44.9]  Elevated troponin [R79.89]  Chronic obstructive pulmonary disease, unspecified COPD type ([facility]) [J44.9]   Chief Complaint   Patient presents with    Fatigue     Frequent falls     Patient Active Problem List    Diagnosis Date Noted    NSTEMI (non-ST elevated myocardial infarction) ([facility]) 2024    Shortness of breath 2024    Elevated troponin 2024    COPD exacerbation ([facility]) 2024    COPD (chronic obstructive pulmonary disease) ([facility]) 2024          Restrictions:fall risk. Droplet precautions       SUBJECTIVE:   Subjective: \"I am feeling a little better and I'd like to get up\"    Pain  Pain: denies pain    OBJECTIVE:   Orientation  Overall Orientation Status: Within Functional Limits  Cognition  Overall Cognitive Status: WFL    Bed Mobility Training  Bed Mobility Training: Yes  Overall Level of Assistance: Minimum assistance  Interventions: Safety awareness training;Verbal cues;Manual cues  Rolling: Stand-by assistance;Minimum assistance  Supine to Sit: Minimum assistance  Scooting: Minimum assistance;Moderate assistance    Transfer Training  Transfer Training: Yes  Overall Level of Assistance: Maximum assistance;Assist X2  Sit to Stand: Moderate assistance  Stand to Sit: Moderate assistance  Stand Pivot Transfers: Maximum assistance;Assist X2  Bed to Chair: Maximum assistance;Assist X2                                              ASSESSMENT pt very impulsive with transfer to chair and required assistance of 2 persons for safety. Pt almost missed the chair. Notified staff that pt did poorly with transfer and to be extra cautious with him. Pt agreeable to staying up for an hour at  least and eat his lunch.        Discharge Recommendations:  Patient would benefit from continued therapy after discharge         Goals  Short Term Goals  Short Term Goal 1: Sit EOB x 8 min with good balance  Short Term Goal 2: Stand with B UE support on FWW with Fair+ balance x2 min  Long Term Goals  Long Term Goal 1: Bed mobility with indep  Long Term Goal 2: Functional transfers with indep  Long Term Goal 3: Amb 50ft with indep  Long Term Goal 4: indep with HEP to improve LE strength and activity tolerance  Patient Goals   Patient Goals : to get stronger and go home    PLAN    General Plan: 1 time a day 3-6 times a week        AMPAC (6 CLICK) BASIC MOBILITY  AM-PAC Inpatient Mobility Raw Score : 9     Therapy Time   Individual   Time In  1145   Time Out 1200   Minutes 15   15 minutes bed mob/transfer          Jane Pearl PTA, 08/20/24 at 12:08 PM         Definitions for assistance levels  Independent = pt does not require any physical supervision or assistance from another person for activity completion. Device may be needed.  Stand by assistance = pt requires verbal cues or instructions from another person, close to but not touching, to perform the activity  Minimal assistance= pt performs 75% or more of the activity; assistance is required to complete the activity  Moderate assistance= pt performs 50% of the activity; assistance is required to complete the activity  Maximal assistance = pt performs 25% of the activity; assistance is required to complete the activity  Dependent = pt requires total physical assistance to accomplish the task

## 2024-08-20 NOTE — PROGRESS NOTES
Infectious Diseases Inpatient Progress Note          HISTORY OF PRESENT ILLNESS:  Follow up bacterial pneumonia superimposed on history of mycobacterial infection and cavitary nodule of right upper lobe, acute respiratory failure on admission on no current antibiotics.  Positive frequent cough, moist and nonproductive.   No shortness of breath.  On 2 L nasal cannula  Decreasing generalized weakness.  Wants to sit up in the chair.   Improving appetite   No fevers  Current Medications:     cefTRIAXone (ROCEPHIN) IV  1,000 mg IntraVENous Q24H    arformoterol tartrate  15 mcg Nebulization BID RT    buPROPion  100 mg Oral BID    dicyclomine  10 mg Oral TID    docusate sodium  100 mg Oral BID    mirtazapine  15 mg Oral Nightly    sucralfate  1 g Oral TID    sodium chloride flush  5-40 mL IntraVENous 2 times per day    enoxaparin  40 mg SubCUTAneous Daily    methylPREDNISolone  40 mg IntraVENous Daily    ipratropium 0.5 mg-albuterol 2.5 mg  1 Dose Inhalation BID RT       Allergies:  Patient has no known allergies.      Review of Systems  Rest of system review is negative other than HPI    Physical Exam  Vitals:    08/19/24 1913 08/19/24 2015 08/20/24 0729 08/20/24 0936   BP: 128/67   133/74   Pulse: 77   80   Resp: 18   18   Temp: 98.2 °F (36.8 °C)   98.4 °F (36.9 °C)   TempSrc: Oral   Oral   SpO2: 95% 96% 96%    Weight:       Height:         General Appearance: alert and oriented, in no acute distress  On 2 L nasal cannula  Skin: warm and dry, no rash.   Head: normocephalic and atraumatic  Eyes: anicteric sclerae  ENT: oropharynx clear and moist with normal mucous membranes. No oral thrush  Lungs: normal respiratory effort, bilateral scattered rhonchi with wheezing  Heart normal S1-S2  Abdomen: soft, no tenderness  No leg edema  No erythema, no tenderness      DATA:    Lab Results   Component Value Date    WBC 9.0 08/19/2024    HGB 11.6 (L) 08/19/2024    HCT 35.4 (L) 08/19/2024    MCV 96.2 (H) 08/19/2024      discharge  Switch to p.o. Ceftin 500 mg twice daily for 1 week on discharge  Follow-up with pulmonary  Oxygen support and weaning as tolerated  Awaiting pre-CERT to go to skilled nursing facility      Discussed with patient and RN    Micki Rodriguez MD

## 2024-08-20 NOTE — PROGRESS NOTES
Hospitalist Progress Note      PCP: Sheila Hernandez MD    Date of Admission: 8/16/2024    Chief Complaint:  no acute events, afebrile, stable HD, remains on 2 L. Cough is persistent but he overall feels he has not worsened since yesterday.     Medications:  Reviewed    Infusion Medications    sodium chloride       Scheduled Medications    cefTRIAXone (ROCEPHIN) IV  1,000 mg IntraVENous Q24H    arformoterol tartrate  15 mcg Nebulization BID RT    buPROPion  100 mg Oral BID    dicyclomine  10 mg Oral TID    docusate sodium  100 mg Oral BID    mirtazapine  15 mg Oral Nightly    sucralfate  1 g Oral TID    sodium chloride flush  5-40 mL IntraVENous 2 times per day    enoxaparin  40 mg SubCUTAneous Daily    methylPREDNISolone  40 mg IntraVENous Daily    ipratropium 0.5 mg-albuterol 2.5 mg  1 Dose Inhalation BID RT     PRN Meds: sodium chloride flush, sodium chloride, ondansetron **OR** ondansetron, polyethylene glycol, acetaminophen **OR** acetaminophen, guaiFENesin-dextromethorphan, ipratropium 0.5 mg-albuterol 2.5 mg      Intake/Output Summary (Last 24 hours) at 8/20/2024 1704  Last data filed at 8/20/2024 0937  Gross per 24 hour   Intake 600 ml   Output 200 ml   Net 400 ml       Exam:    /84   Pulse 91   Temp 98.1 °F (36.7 °C) (Oral)   Resp 18   Ht 1.803 m (5' 11\")   Wt 72.6 kg (160 lb)   SpO2 96%   BMI 22.32 kg/m²     General appearance: alert, cooperative  Lungs: diminished with faint wheezing bilaterally  Heart: S1/S2,RRR  Abdomen: soft, active BS  Extremities: no edema         Labs:   Recent Labs     08/18/24  0748 08/19/24  0638   WBC 7.8 9.0   HGB 11.3* 11.6*   HCT 34.8* 35.4*    301     Recent Labs     08/18/24  0748 08/19/24  0638    143   K 3.9 3.8    103   CO2 29 32*   BUN 19 17   CREATININE 0.67* 0.72   CALCIUM 8.9 9.2   PHOS 2.8 3.3     No results for input(s): \"AST\", \"ALT\", \"BILIDIR\", \"BILITOT\", \"ALKPHOS\" in the last 72 hours.  No results for input(s): \"INR\" in the  cardiology       Diet: ADULT DIET; Regular    Code Status: Full Code      Disposition - SNF        Electronically signed by Andrew Camargo MD on 8/20/2024 at 5:04 PM

## 2024-08-20 NOTE — PROGRESS NOTES
Cardiology progress note    Patient Name: Claude Xie  Admit Date: 2024 12:34 PM  MR #: [id number]  : 1950    Attending Physician: Andrew Camargo MD  Reason for consult: Elevated troponins    History of Presenting Illness:      Claude Xie is a 74 y.o. male on hospital day 4 with a history of COPD, lung disease apparent the Mycobacterium infection which seems chronic, NPH status post  shunt, dementia, hypertension, hyperlipidemia, who was admitted to the hospital after sustaining a fall. History Obtained From:  patient, electronic medical record    In the ER patient was found hypoxic  CT of the chest consistent with inflammatory, infectious process, severe emphysema  Troponins elevated at 100 trended down to 87  EKG sinus rhythm without obvious signs of ischemia  ==============  Hospital course  2024  Patient sitting denies chest pain, shortness of breath improving  Telemetry sinus rhythm    2024  Patient laying in bed comfortably  Denies chest pain  Denies shortness of breath, patient still coughing, productive cough  Telemetry sinus rhythm    2024  Patient laying in bed looks comfortable  Denies chest pain  Still having productive cough  Daughter and son-in-law at bedside    History:      History reviewed. No pertinent past medical history.  History reviewed. No pertinent surgical history.  Family History  History reviewed. No pertinent family history.  [] Unable to obtain due to ventilated and/ or neurologic status  Social History     Socioeconomic History    Marital status:      Spouse name: Not on file    Number of children: Not on file    Years of education: Not on file    Highest education level: Not on file   Occupational History    Not on file   Tobacco Use    Smoking status: Former     Types: Cigarettes    Smokeless tobacco: Never    Tobacco comments:     Quit 6mo ago   Vaping Use    Vaping status: Never Used   Substance and Sexual Activity    Alcohol use: Yes      Admission: sucralfate (CARAFATE) 1 GM tablet, Take 1 tablet by mouth 3 times daily  Multiple Vitamins-Minerals (ONE-A-DAY 50 PLUS PO), Take 1 tablet by mouth daily (with breakfast)  mirtazapine (REMERON) 15 MG tablet, Take 1 tablet by mouth nightly  docusate (COLACE, DULCOLAX) 100 MG CAPS, Take 100 mg by mouth 2 times daily  dicyclomine (BENTYL) 10 MG capsule, Take 1 capsule by mouth 3 times daily  arformoterol tartrate (BROVANA) 15 MCG/2ML NEBU, Inhale 2 mLs into the lungs in the morning and 2 mLs in the evening.  buPROPion (WELLBUTRIN SR) 100 MG extended release tablet, Take 1 tablet by mouth 2 times daily  [DISCONTINUED] sodium chloride, Inhalant, 3 % nebulizer solution, Inhale 4 mLs into the lungs 2 times daily  [DISCONTINUED] memantine (NAMENDA) 10 MG tablet, Take 1 tablet by mouth daily  Take 1/2 tablet daily x 7 days,then 1/2 tablet 2x daily x 7 days, then 1/2 tab in AM & 1tab @ dinner x7 days then 1 tab 2x daily thereafter    Instructions: Take 1/2 tablet daily x 7 days,then 1/2 tablet 2x daily x 7 days, then 1/2 tab in AM & 1tab @ dinner x7 days then 1 tab 2x daily thereafter    Current Hospital Medications:   Scheduled Meds:   cefTRIAXone (ROCEPHIN) IV  1,000 mg IntraVENous Q24H    arformoterol tartrate  15 mcg Nebulization BID RT    buPROPion  100 mg Oral BID    dicyclomine  10 mg Oral TID    docusate sodium  100 mg Oral BID    mirtazapine  15 mg Oral Nightly    sucralfate  1 g Oral TID    sodium chloride flush  5-40 mL IntraVENous 2 times per day    enoxaparin  40 mg SubCUTAneous Daily    methylPREDNISolone  40 mg IntraVENous Daily    ipratropium 0.5 mg-albuterol 2.5 mg  1 Dose Inhalation BID RT     Continuous Infusions:   sodium chloride       PRN Meds:.sodium chloride flush, sodium chloride, ondansetron **OR** ondansetron, polyethylene glycol, acetaminophen **OR** acetaminophen, guaiFENesin-dextromethorphan, ipratropium 0.5 mg-albuterol 2.5 mg   sodium chloride        Allergies:   No Known Allergies

## 2024-08-20 NOTE — PROGRESS NOTES
08/19/24 2000   RT Protocol   History Pulmonary Disease 2   Respiratory pattern 0   Breath sounds 4   Cough 0   Indications for Bronchodilator Therapy Wheezing associated with pulm disorder   Bronchodilator Assessment Score 6

## 2024-08-21 LAB — BACTERIA SPEC RESP CULT: NORMAL

## 2024-08-21 PROCEDURE — 2500000003 HC RX 250 WO HCPCS: Performed by: INTERNAL MEDICINE

## 2024-08-21 PROCEDURE — 6360000002 HC RX W HCPCS: Performed by: INTERNAL MEDICINE

## 2024-08-21 PROCEDURE — 94761 N-INVAS EAR/PLS OXIMETRY MLT: CPT

## 2024-08-21 PROCEDURE — 6360000002 HC RX W HCPCS: Performed by: STUDENT IN AN ORGANIZED HEALTH CARE EDUCATION/TRAINING PROGRAM

## 2024-08-21 PROCEDURE — 94640 AIRWAY INHALATION TREATMENT: CPT

## 2024-08-21 PROCEDURE — 6370000000 HC RX 637 (ALT 250 FOR IP): Performed by: INTERNAL MEDICINE

## 2024-08-21 PROCEDURE — 99232 SBSQ HOSP IP/OBS MODERATE 35: CPT | Performed by: INTERNAL MEDICINE

## 2024-08-21 PROCEDURE — 1210000000 HC MED SURG R&B

## 2024-08-21 PROCEDURE — 97535 SELF CARE MNGMENT TRAINING: CPT

## 2024-08-21 PROCEDURE — 2700000000 HC OXYGEN THERAPY PER DAY

## 2024-08-21 PROCEDURE — 2580000003 HC RX 258: Performed by: INTERNAL MEDICINE

## 2024-08-21 PROCEDURE — 2580000003 HC RX 258: Performed by: STUDENT IN AN ORGANIZED HEALTH CARE EDUCATION/TRAINING PROGRAM

## 2024-08-21 RX ORDER — BUDESONIDE 0.5 MG/2ML
0.5 INHALANT ORAL
Status: DISCONTINUED | OUTPATIENT
Start: 2024-08-21 | End: 2024-08-23 | Stop reason: HOSPADM

## 2024-08-21 RX ADMIN — DOCUSATE SODIUM 100 MG: 100 CAPSULE, LIQUID FILLED ORAL at 20:10

## 2024-08-21 RX ADMIN — GUAIFENESIN SYRUP AND DEXTROMETHORPHAN 5 ML: 100; 10 SYRUP ORAL at 10:28

## 2024-08-21 RX ADMIN — METHYLPREDNISOLONE SODIUM SUCCINATE 40 MG: 40 INJECTION INTRAMUSCULAR; INTRAVENOUS at 09:57

## 2024-08-21 RX ADMIN — SUCRALFATE 1 G: 1 TABLET ORAL at 20:09

## 2024-08-21 RX ADMIN — ENOXAPARIN SODIUM 40 MG: 100 INJECTION SUBCUTANEOUS at 09:56

## 2024-08-21 RX ADMIN — MIRTAZAPINE 15 MG: 30 TABLET, FILM COATED ORAL at 20:09

## 2024-08-21 RX ADMIN — Medication 10 ML: at 20:10

## 2024-08-21 RX ADMIN — SUCRALFATE 1 G: 1 TABLET ORAL at 14:24

## 2024-08-21 RX ADMIN — DICYCLOMINE HYDROCHLORIDE 10 MG: 10 CAPSULE ORAL at 09:56

## 2024-08-21 RX ADMIN — SUCRALFATE 1 G: 1 TABLET ORAL at 09:56

## 2024-08-21 RX ADMIN — IPRATROPIUM BROMIDE AND ALBUTEROL SULFATE 1 DOSE: 2.5; .5 SOLUTION RESPIRATORY (INHALATION) at 19:36

## 2024-08-21 RX ADMIN — IPRATROPIUM BROMIDE AND ALBUTEROL SULFATE 1 DOSE: 2.5; .5 SOLUTION RESPIRATORY (INHALATION) at 07:04

## 2024-08-21 RX ADMIN — BUPROPION HYDROCHLORIDE 100 MG: 100 TABLET, FILM COATED, EXTENDED RELEASE ORAL at 09:56

## 2024-08-21 RX ADMIN — Medication 10 ML: at 09:57

## 2024-08-21 RX ADMIN — DICYCLOMINE HYDROCHLORIDE 10 MG: 10 CAPSULE ORAL at 20:09

## 2024-08-21 RX ADMIN — BUPROPION HYDROCHLORIDE 100 MG: 100 TABLET, FILM COATED, EXTENDED RELEASE ORAL at 20:09

## 2024-08-21 RX ADMIN — DICYCLOMINE HYDROCHLORIDE 10 MG: 10 CAPSULE ORAL at 14:24

## 2024-08-21 RX ADMIN — CEFTRIAXONE SODIUM 1000 MG: 1 INJECTION, POWDER, FOR SOLUTION INTRAMUSCULAR; INTRAVENOUS at 17:11

## 2024-08-21 RX ADMIN — DOCUSATE SODIUM 100 MG: 100 CAPSULE, LIQUID FILLED ORAL at 09:56

## 2024-08-21 RX ADMIN — BUDESONIDE INHALATION 500 MCG: 0.5 SUSPENSION RESPIRATORY (INHALATION) at 19:36

## 2024-08-21 ASSESSMENT — PAIN SCALES - GENERAL: PAINLEVEL_OUTOF10: 0

## 2024-08-21 NOTE — CARE COORDINATION
This LSW called [facility] in [facility] at 10:45am to discuss patients referral.   I left voicemail message for admissions, awaiting a return call at this time. I also notified patient and left voicemail for patients daughterJordan.  LSW/CM to follow.  Electronically signed by ELSA Walter on 8/21/24 at 10:51 AM EDT     This LSW has not received return call from [facility]. I called [facility] again and spoke with Lianne. Per Lianne patients referral is  being reviewed. Lianne to call this LSW once referral has been reviewed.  I called and updated daughterJordan.  LSW/CM to follow.  Electronically signed by ELSA Walter on 8/21/24 at 3:28 PM EDT    This LSW received call from Lianne at [facility] at 3:40pm. Lianne stated that referral needs to be refaxed. This LSW refaxed referral to Lianne at [facility] at 3:35pm.  Electronically signed by ELSA Walter on 8/21/24 at 3:46 PM EDT

## 2024-08-21 NOTE — PROGRESS NOTES
INPATIENT PROGRESS NOTES    PATIENT NAME: Claude Xie  MRN: [id number]  SERVICE DATE:  August 21, 2024   SERVICE TIME:  1:49 PM      PRIMARY SERVICE: Pulmonary Disease    CHIEF COMPLAINTS: COPD exacerbation    INTERVAL HPI: Patient seen and examined at bedside, Interval Notes, orders reviewed. Nursing notes noted    Patient report complains of cough, essentially dry, no chest pain, shortness of breath at baseline, no heartburn, no lower extremity edema and no fever.  He is on 2 L O2 saturation 98%    New information updated in the note today, rest of the examination did not change compared to yesterday.    Review of system:     GI Abdominal pain No  Skin Rash No    Social History     Tobacco Use    Smoking status: Former     Types: Cigarettes    Smokeless tobacco: Never    Tobacco comments:     Quit 6mo ago   Substance Use Topics    Alcohol use: Yes     Comment: Occassionally     History reviewed. No pertinent family history.      OBJECTIVE    Body mass index is 22.32 kg/m².    PHYSICAL EXAM:  Vitals:  BP (!) 140/69   Pulse 63   Temp 97.5 °F (36.4 °C) (Oral)   Resp 16   Ht 1.803 m (5' 11\")   Wt 72.6 kg (160 lb)   SpO2 98%   BMI 22.32 kg/m²     General: alert, cooperative, no distress  Head: normocephalic, atraumatic  Eyes:No gross abnormalities.  ENT:  MMM no lesions  Neck:  supple and no masses  Chest : clear to auscultation bilaterally- no wheezes, rales or rhonchi, normal air movement, no respiratory distress  Heart:: Heart sounds are normal.  Regular rate and rhythm without murmur, gallop or rub.  ABD:  symmetric, soft, non-tender  Musculoskeletal : no cyanosis, no clubbing, and no edema  Neuro:  Grossly normal  Skin: No rashes or nodules noted.  Lymph node:  no cervical nodes  Urology: No Charles   Psychiatric: appropriate    DATA:   Recent Labs     08/19/24  0638   WBC 9.0   HGB 11.6*   HCT 35.4*   MCV 96.2*        Recent Labs     08/19/24  0638      K 3.8      CO2 32*   BUN 17

## 2024-08-21 NOTE — PLAN OF CARE
Problem: Discharge Planning  Goal: Discharge to home or other facility with appropriate resources  8/20/2024 2251 by Rufino Richard RN  Outcome: Progressing  8/20/2024 1218 by Katelyn Sol RN  Outcome: Progressing     Problem: Skin/Tissue Integrity  Goal: Absence of new skin breakdown  Description: 1.  Monitor for areas of redness and/or skin breakdown  2.  Assess vascular access sites hourly  3.  Every 4-6 hours minimum:  Change oxygen saturation probe site  4.  Every 4-6 hours:  If on nasal continuous positive airway pressure, respiratory therapy assess nares and determine need for appliance change or resting period.  8/20/2024 2251 by Rufino Richard, RN  Outcome: Progressing  8/20/2024 1218 by Katelyn Sol RN  Outcome: Progressing     Problem: Safety - Adult  Goal: Free from fall injury  8/20/2024 2251 by Rufino Richard, RN  Outcome: Progressing  8/20/2024 1218 by Katelyn Sol RN  Outcome: Progressing

## 2024-08-21 NOTE — DISCHARGE INSTR - COC
Continuity of Care Form    Patient Name: Claued Xie   :  1950  MRN:  [id number]    Admit date:  2024  Discharge date:  24    Code Status Order: Full Code   Advance Directives:   Advance Care Flowsheet Documentation             Admitting Physician:  Tomas Lee MD  PCP: Sheila Hernandez MD    Discharging Nurse: Cassandra RUCKER  Discharging Hospital Unit/Room#: W489/W489-01  Discharging Unit Phone Number: [phone]    Emergency Contact:   Extended Emergency Contact Information  Primary Emergency Contact: Jordan Mcdonald  Mobile Phone: [phone]  Relation: Child  Secondary Emergency Contact: Claude Xie Jr  Mobile Phone: [phone]  Relation: Child    Past Surgical History:  History reviewed. No pertinent surgical history.    Immunization History:   Immunization History   Administered Date(s) Administered    COVID-19, MODERNA, ( formula), (age 12y+), IM, 50mcg/0.5mL 2023    COVID-19, PFIZER Bivalent, DO NOT Dilute, (age 12y+), IM, 30 mcg/0.3 mL 2022    COVID-19, PFIZER GRAY top, DO NOT Dilute, (age 12 y+), IM, 30 mcg/0.3 mL 2022       Active Problems:  Patient Active Problem List   Diagnosis Code    COPD (chronic obstructive pulmonary disease) ([facility]) J44.9    COPD exacerbation ([facility]) J44.1    Shortness of breath R06.02    Elevated troponin R79.89    NSTEMI (non-ST elevated myocardial infarction) ([facility]) I21.4       Isolation/Infection:   Isolation            No Isolation          Patient Infection Status       Infection Onset Added Last Indicated Last Indicated By Review Planned Expiration Resolved Resolved By    None active    Resolved    Haemophilus influenza 24 Culture, Respiratory  history 24 Edel Lopez RN    Isolation removed per Dr. Rodriguez                        Nurse Assessment:  Last Vital Signs: BP (!) 140/69   Pulse 63   Temp 97.5 °F (36.4 °C)   Resp 16   Ht 1.803 m (5' 11\")   Wt 72.6 kg (160 lb)   SpO2 98%   BMI  22.32 kg/m²     Last documented pain score (0-10 scale): Pain Level: 0  Last Weight:   Wt Readings from Last 1 Encounters:   08/16/24 72.6 kg (160 lb)     Mental Status:  oriented and alert    IV Access:  - None    Nursing Mobility/ADLs:  Walking   Assisted  Transfer  Assisted  Bathing  Assisted  Dressing  Assisted  Toileting  Assisted  Feeding  Independent  Med Admin  Assisted  Med Delivery   whole    Wound Care Documentation and Therapy:  Wound 08/16/24 Arm Left;Lower;Posterior (Active)   Wound Etiology Traumatic 08/19/24 0830   Dressing Status Clean;Dry;Intact 08/20/24 2252   Wound Cleansed Cleansed with saline 08/20/24 0937   Dressing/Treatment Xeroform;Gauze dressing/dressing sponge;Roll gauze 08/20/24 0937   Dressing Change Due 08/20/24 08/20/24 0937   Wound Length (cm) 13 cm 08/19/24 0830   Wound Assessment [place]rr[place]/red;Superficial 08/20/24 0937   Drainage Amount Scant (moist but unmeasurable) 08/20/24 0937   Drainage Description Serous 08/20/24 0937   Odor None 08/20/24 0937   Sue-wound Assessment Fragile 08/20/24 0937   Margins Attached edges 08/20/24 0937   Wound Thickness Description not for Pressure Injury Partial thickness 08/19/24 0830   Number of days: 5        Elimination:  Continence:   Bowel: Yes  Bladder: Yes  Urinary Catheter: None   Colostomy/Ileostomy/Ileal Conduit: No       Date of Last BM: 8/23/24    Intake/Output Summary (Last 24 hours) at 8/21/2024 0936  Last data filed at 8/21/2024 0100  Gross per 24 hour   Intake 644.66 ml   Output 950 ml   Net -305.34 ml     I/O last 3 completed shifts:  In: 654.7 [P.O.:590; I.V.:20; IV Piggyback:44.7]  Out: 950 [Urine:950]    Safety Concerns:     At Risk for Falls    Impairments/Disabilities:      Hearing    Nutrition Therapy:  Current Nutrition Therapy:   - Oral Diet:  General    Routes of Feeding: Oral  Liquids: Thin Liquids  Daily Fluid Restriction: no  Last Modified Barium Swallow with Video (Video Swallowing Test): not done    Treatments at the Time

## 2024-08-21 NOTE — PROGRESS NOTES
Cardiology progress note    Patient Name: Claude Xie  Admit Date: 2024 12:34 PM  MR #: [id number]  : 1950    Attending Physician: Andrew Camargo MD  Reason for consult: Elevated troponins    History of Presenting Illness:      Claude Xie is a 74 y.o. male on hospital day 5 with a history of COPD, lung disease apparent the Mycobacterium infection which seems chronic, NPH status post  shunt, dementia, hypertension, hyperlipidemia, who was admitted to the hospital after sustaining a fall. History Obtained From:  patient, electronic medical record    In the ER patient was found hypoxic  CT of the chest consistent with inflammatory, infectious process, severe emphysema  Troponins elevated at 100 trended down to 87  EKG sinus rhythm without obvious signs of ischemia  ==============  Hospital course  2024  Patient sitting in bed looks comfortable  On nasal cannula  Denies chest pain  Reports that shortness of breath is improving  Awaiting placement    2024  Patient sitting denies chest pain, shortness of breath improving  Telemetry sinus rhythm    2024  Patient laying in bed comfortably  Denies chest pain  Denies shortness of breath, patient still coughing, productive cough  Telemetry sinus rhythm    2024  Patient laying in bed looks comfortable  Denies chest pain  Still having productive cough  Daughter and son-in-law at bedside    History:      History reviewed. No pertinent past medical history.  History reviewed. No pertinent surgical history.  Family History  History reviewed. No pertinent family history.  [] Unable to obtain due to ventilated and/ or neurologic status  Social History     Socioeconomic History    Marital status:      Spouse name: Not on file    Number of children: Not on file    Years of education: Not on file    Highest education level: Not on file   Occupational History    Not on file   Tobacco Use    Smoking status: Former     Types: Cigarettes     for input(s): \"INR\" in the last 72 hours.    Invalid input(s): \"PROT\"  No results found for this or any previous visit.       Impression:     -Elevated troponins, troponins peaked at 100  Possible demand ischemia in the setting of ongoing COPD exacerbation/pneumonia, possible underlying CAD  TTE 8/17/2024 EF 65 to 70%  Ischemic evaluation as an outpatient  Continue aspirin and atorvastatin for presumptive CAD    Other medical problems:  COPD exacerbation  NPH status post  shunt  Hypertension  Hyperlipidemia      Comments:     Thank you for allowing us to participate in the care of this patient.     Will continue to follow.    Please call if questions or concerns arise.  Of note:  Patient was examined and evaluated on 8/19/2024  This note was signed on 8/20/2024    Electronically signed by Epi Lim MD on 8/21/2024 at 3:47 PM    Please note this report has been partially produced using speech recognition software and may cause contain errors related to that system including grammar, punctuation and spelling as well as words and phrases that may seem inappropriate. If there are questions or concerns please feel free to contact me to clarify.

## 2024-08-21 NOTE — PROGRESS NOTES
Pt A&O x4, calm and cooperative. Medications given per MAR. Reg diet tolerated. Skin tear dressing changed. External catheter patent. Pt transferred to bedside commode with assistance. Bed locked and in low position, socks on and call light within reach.     Electronically signed by Katelyn Sol RN on 8/21/2024 at 6:02 PM

## 2024-08-21 NOTE — PLAN OF CARE
Problem: Discharge Planning  Goal: Discharge to home or other facility with appropriate resources  8/21/2024 1222 by Katelyn Sol RN  Outcome: Progressing  Flowsheets (Taken 8/20/2024 2252 by Rufino Richard RN)  Discharge to home or other facility with appropriate resources: Identify barriers to discharge with patient and caregiver  8/20/2024 2251 by Rufino Richard RN  Outcome: Progressing     Problem: Skin/Tissue Integrity  Goal: Absence of new skin breakdown  Description: 1.  Monitor for areas of redness and/or skin breakdown  2.  Assess vascular access sites hourly  3.  Every 4-6 hours minimum:  Change oxygen saturation probe site  4.  Every 4-6 hours:  If on nasal continuous positive airway pressure, respiratory therapy assess nares and determine need for appliance change or resting period.  8/21/2024 1222 by Katelyn Sol RN  Outcome: Progressing  8/20/2024 2251 by Rufino Richard RN  Outcome: Progressing     Problem: Safety - Adult  Goal: Free from fall injury  8/21/2024 1222 by Katelyn Sol RN  Outcome: Progressing  8/20/2024 2251 by Rufino Richard RN  Outcome: Progressing

## 2024-08-21 NOTE — PROGRESS NOTES
Physical Therapy Med Surg Daily Treatment Note  Facility/Department: [address] MED SURG UNIT  Room: [place] [address]       NAME: Claude Xie  : 1950 (74 y.o.)  MRN: [id number]  CODE STATUS: Full Code    Date of Service: 2024    Patient Diagnosis(es): COPD (chronic obstructive pulmonary disease) ([facility]) [J44.9]  Elevated troponin [R79.89]  Chronic obstructive pulmonary disease, unspecified COPD type ([facility]) [J44.9]   Chief Complaint   Patient presents with    Fatigue     Frequent falls     Patient Active Problem List    Diagnosis Date Noted    NSTEMI (non-ST elevated myocardial infarction) ([facility]) 2024    Shortness of breath 2024    Elevated troponin 2024    COPD exacerbation ([facility]) 2024    COPD (chronic obstructive pulmonary disease) ([facility]) 2024        History reviewed. No pertinent past medical history.  History reviewed. No pertinent surgical history.    Chart Reviewed: Yes  Patient assessed for rehabilitation services?: Yes  Family / Caregiver Present: No    Restrictions:  Restrictions/Precautions: Fall Risk    SUBJECTIVE:   Subjective: Patient sitting up in chair. Agreeable to tx. Request to return BTB.  Response To Previous Treatment: Patient with no complaints from previous session.    Pain  Patient denies pain pre/post session     OBJECTIVE:        Bed mobility  Supine to Sit: Stand by assistance  Sit to Supine: Stand by assistance  Scooting: Stand by assistance  Bed Mobility Comments: HOB flat. Patient requires increased time and effort to complete.    Transfers  Sit to Stand: Moderate Assistance;2 Person Assistance;Minimal Assistance  Stand to Sit: Moderate Assistance;2 Person Assistance;Minimal Assistance  Comment: Patient requires mod A to stand up from chair.  Completes STS x3 from bed. Ability improves with practice.    Ambulation  Surface: Level tile  Device: Rolling Walker  Other Apparatus: O2 (2 liters)  Assistance: 2 Person assistance;Minimal assistance;Moderate  assistance  Quality of Gait: bilateral knees flexed, crouched posture, NBOS, unsteady  Distance: 3 feet chair to bed; 3 feet laterally at EOB x3  Comments: SPO2 92% following activity.    Exercises:  Static standing up to 1 minute at ww         ASSESSMENT   Body Structures, Functions, Activity Limitations Requiring Skilled Therapeutic Intervention: Decreased functional mobility ;Decreased strength;Decreased safe awareness;Decreased endurance;Decreased balance;Decreased posture;Decreased cognition  Assessment: Patient continues to demonstrate difficulty with transfers and gait. He completes multiple STS transfers from bed. Patient initially demonstrates significant knee flexion Bilaterally. He was able to improve with cues for posture and practice.Patient unsafe to ambulate away from bedside this date.  Therapy Prognosis: Good  Barriers to Learning: cognitive deficits, poor insight into deficits     Discharge Recommendations:  Patient would benefit from continued therapy after discharge         Goals  Short Term Goals  Short Term Goal 1: Sit EOB x 8 min with good balance  Short Term Goal 2: Stand with B UE support on FWW with Fair+ balance x2 min  Long Term Goals  Long Term Goal 1: Bed mobility with indep  Long Term Goal 2: Functional transfers with indep  Long Term Goal 3: Amb 50ft with indep  Long Term Goal 4: indep with HEP to improve LE strength and activity tolerance  Patient Goals   Patient Goals : to get stronger and go home    PLAN    General Plan: 1 time a day 3-6 times a week  Safety Devices  Type of Devices: All fall risk precautions in place, Call light within reach, Chair alarm in place, Left in chair, Nurse notified     [facility] (6 CLICK) BASIC MOBILITY  AM-PAC Inpatient Mobility Raw Score : 9   Therapy Time   Individual   Time In 1302   Time Out 1325   Minutes 23     Timed Code Treatment Minutes: 23 Minutes  Gt 5  Bed mob/tr 18    Marielena Garza PTA, 08/21/24 at 2:47 PM         Definitions for

## 2024-08-21 NOTE — PROGRESS NOTES
[facility]CC[facility]ATIONAL THERAP[facility] MED SURG TREATMENT NOTE     Date: 2024  Patient Name: Claude Xie        MRN: [id number]  Account: [id number]   : 1950  (74 y.o.)  Room: [place] [id number]    Chart Review:    Restrictions  Restrictions/Precautions  Restrictions/Precautions: Fall Risk     Safety:  Safety Devices  Type of Devices: All fall risk precautions in place;Call light within reach;Chair alarm in place;Left in chair;Nurse notified    Patient's birthday verified: Yes    Subjective:    Subjective: \"I just can't believe how shaky I am.\"       Pain at start of treatment: No    Pain at end of treatment: No    Objective:    ADL Status:  ADL  Toileting Skilled Clinical Factors: pt on purwick. attempted to have BM on BSC. Pt passed gas but was unable to have BM.  Functional Mobility: Moderate assistance  Functional Mobility Skilled Clinical Factors: +2 for stand-pivot transfer to BSC, then chair w/ RW.  Additional Comments: +2 assist for LB ADLs d/t unsteadiness in standing.  Toilet Transfers  Toilet - Technique: Stand pivot  Equipment Used: Standard bedside commode  Toilet Transfer: Moderate assistance;2 Person assistance  Toilet Transfers Comments: RW, verbal cues for hand placement and tactile cues to guide hips during pivot.    FIONA Hose donned: No  If no - why  not ordered    Therapy key for assistance levels -   Independent/Mod I = Pt. is able to perform task with no assistance but may require a device   Stand by assistance = Pt. does not perform task at an independent level but does not need physical assistance, requires verbal cues  Minimal, Moderate, Maximal Assistance = Pt. requires physical assistance (25%, 50%, 75% assist from helper) for task but is able to actively participate in task   Dependent = Pt. requires total assistance with task and is not able to actively participate with task completion    Orientation Status:  Orientation  Overall Orientation Status: Within Functional

## 2024-08-21 NOTE — PROGRESS NOTES
08/20/24 2000   RT Protocol   History Pulmonary Disease 2   Respiratory pattern 0   Breath sounds 2   Cough 0   Indications for Bronchodilator Therapy Decreased or absent breath sounds   Bronchodilator Assessment Score 4

## 2024-08-21 NOTE — PROGRESS NOTES
Hospitalist Progress Note      PCP: Sheila Hernandez MD    Date of Admission: 8/16/2024    Chief Complaint:  no acute events, afebrile, stable HD, remains on 2 L. Cough is persistent but he overall improved compared to yesterday.     Medications:  Reviewed    Infusion Medications    sodium chloride       Scheduled Medications    budesonide  0.5 mg Nebulization BID RT    cefTRIAXone (ROCEPHIN) IV  1,000 mg IntraVENous Q24H    arformoterol tartrate  15 mcg Nebulization BID RT    buPROPion  100 mg Oral BID    dicyclomine  10 mg Oral TID    docusate sodium  100 mg Oral BID    mirtazapine  15 mg Oral Nightly    sucralfate  1 g Oral TID    sodium chloride flush  5-40 mL IntraVENous 2 times per day    enoxaparin  40 mg SubCUTAneous Daily    ipratropium 0.5 mg-albuterol 2.5 mg  1 Dose Inhalation BID RT     PRN Meds: sodium chloride flush, sodium chloride, ondansetron **OR** ondansetron, polyethylene glycol, acetaminophen **OR** acetaminophen, guaiFENesin-dextromethorphan, ipratropium 0.5 mg-albuterol 2.5 mg      Intake/Output Summary (Last 24 hours) at 8/21/2024 1538  Last data filed at 8/21/2024 0830  Gross per 24 hour   Intake 414.66 ml   Output 950 ml   Net -535.34 ml       Exam:    /76   Pulse 64   Temp 97.5 °F (36.4 °C) (Oral)   Resp 16   Ht 1.803 m (5' 11\")   Wt 72.6 kg (160 lb)   SpO2 97%   BMI 22.32 kg/m²     General appearance: alert, cooperative  Lungs: diminished with faint wheezing bilaterally  Heart: S1/S2,RRR  Abdomen: soft, active BS  Extremities: no edema         Labs:   Recent Labs     08/19/24  0638   WBC 9.0   HGB 11.6*   HCT 35.4*        Recent Labs     08/19/24  0638      K 3.8      CO2 32*   BUN 17   CREATININE 0.72   CALCIUM 9.2   PHOS 3.3     No results for input(s): \"AST\", \"ALT\", \"BILIDIR\", \"BILITOT\", \"ALKPHOS\" in the last 72 hours.  No results for input(s): \"INR\" in the last 72 hours.  No results for input(s): \"CKTOTAL\", \"TROPONINI\" in the last 72  hours.    Urinalysis:    No results found for: \"NITRU\", \"WBCUA\", \"BACTERIA\", \"RBCUA\", \"BLOODU\", \"SPECGRAV\", \"GLUCOSEU\"    Radiology:  CT CHEST WO CONTRAST   Final Result   1. Partially collapsed cavitary lesion in the right upper lobe measuring 6.4   x 5.4 x 3.4 cm. The margins of the collapsed cavity are nodular. Similar   findings were observed on the CT of the cervical spine from 10/13/2022, when   the cavitary lesion was only partially included in the field view of the   study.  An infectious/inflammatory etiology is therefore favored over   malignancy.  However, as the study did not fully include the cavity, further   evaluation with PET scan may be considered.   2. Severe emphysematous changes in the lungs.   3. Probable pulmonary arterial hypertension.   4. Age indeterminate compression fracture deformities in the T1, T2, T6 and   T11 vertebral bodies.    If indicated, MRI may be obtained for further   evaluation.         XR CHEST (SINGLE VIEW FRONTAL)   Final Result   1. COPD.   2. Bilateral reticular prominence in both lungs.   3. No evidence of pleural fluid or pneumonia.                 Assessment/Plan:    74 y.o. male with PMH of severe COPD,Mycobacterium Xenopi cavitary lung disease,  NPH s/p  shunt, dementia, depression, who presented with:     Acute hypoxic/hypercapnic respiratory failure  Haemophilus pneumonia   - due to acute COPD exacerbation   - requiring 3 liters of O2 on arrival, down to 2 liters today  - CT chest showed chronic RUL cavitary lesion and severe emphysema  - sputum culture growing Haemophilus influenzae  - starting rocephin. Transition to Ceftin at discharge  - continue Duonebs, Zithromax, Solumedrol  - followed by pulmonology  -PRN robitussin     Elevated troponins  - in  the setting of acute respiratory illness  - ECG showed SR with no acute ischemic changes per report  - TTE showed preserved LVEF  - followed by cardiology       Diet: ADULT DIET; Regular    Code Status: Full  Code      Disposition - SNF        Electronically signed by Andrew Camargo MD on 8/21/2024 at 3:38 PM

## 2024-08-22 PROCEDURE — 2580000003 HC RX 258: Performed by: INTERNAL MEDICINE

## 2024-08-22 PROCEDURE — 6370000000 HC RX 637 (ALT 250 FOR IP): Performed by: INTERNAL MEDICINE

## 2024-08-22 PROCEDURE — 2700000000 HC OXYGEN THERAPY PER DAY

## 2024-08-22 PROCEDURE — 6360000002 HC RX W HCPCS: Performed by: INTERNAL MEDICINE

## 2024-08-22 PROCEDURE — 99232 SBSQ HOSP IP/OBS MODERATE 35: CPT | Performed by: INTERNAL MEDICINE

## 2024-08-22 PROCEDURE — 2580000003 HC RX 258: Performed by: STUDENT IN AN ORGANIZED HEALTH CARE EDUCATION/TRAINING PROGRAM

## 2024-08-22 PROCEDURE — 97116 GAIT TRAINING THERAPY: CPT

## 2024-08-22 PROCEDURE — 94761 N-INVAS EAR/PLS OXIMETRY MLT: CPT

## 2024-08-22 PROCEDURE — 94640 AIRWAY INHALATION TREATMENT: CPT

## 2024-08-22 PROCEDURE — 6360000002 HC RX W HCPCS: Performed by: STUDENT IN AN ORGANIZED HEALTH CARE EDUCATION/TRAINING PROGRAM

## 2024-08-22 PROCEDURE — 1210000000 HC MED SURG R&B

## 2024-08-22 RX ADMIN — Medication 10 ML: at 09:39

## 2024-08-22 RX ADMIN — DICYCLOMINE HYDROCHLORIDE 10 MG: 10 CAPSULE ORAL at 14:07

## 2024-08-22 RX ADMIN — IPRATROPIUM BROMIDE AND ALBUTEROL SULFATE 1 DOSE: 2.5; .5 SOLUTION RESPIRATORY (INHALATION) at 20:12

## 2024-08-22 RX ADMIN — Medication 10 ML: at 21:34

## 2024-08-22 RX ADMIN — BUDESONIDE INHALATION 500 MCG: 0.5 SUSPENSION RESPIRATORY (INHALATION) at 07:26

## 2024-08-22 RX ADMIN — MIRTAZAPINE 15 MG: 30 TABLET, FILM COATED ORAL at 21:33

## 2024-08-22 RX ADMIN — BUDESONIDE INHALATION 500 MCG: 0.5 SUSPENSION RESPIRATORY (INHALATION) at 20:12

## 2024-08-22 RX ADMIN — IPRATROPIUM BROMIDE AND ALBUTEROL SULFATE 1 DOSE: 2.5; .5 SOLUTION RESPIRATORY (INHALATION) at 07:26

## 2024-08-22 RX ADMIN — ENOXAPARIN SODIUM 40 MG: 100 INJECTION SUBCUTANEOUS at 09:38

## 2024-08-22 RX ADMIN — DOCUSATE SODIUM 100 MG: 100 CAPSULE, LIQUID FILLED ORAL at 21:33

## 2024-08-22 RX ADMIN — DICYCLOMINE HYDROCHLORIDE 10 MG: 10 CAPSULE ORAL at 21:33

## 2024-08-22 RX ADMIN — DICYCLOMINE HYDROCHLORIDE 10 MG: 10 CAPSULE ORAL at 09:38

## 2024-08-22 RX ADMIN — CEFTRIAXONE SODIUM 1000 MG: 1 INJECTION, POWDER, FOR SOLUTION INTRAMUSCULAR; INTRAVENOUS at 16:36

## 2024-08-22 RX ADMIN — SUCRALFATE 1 G: 1 TABLET ORAL at 14:07

## 2024-08-22 RX ADMIN — SUCRALFATE 1 G: 1 TABLET ORAL at 21:33

## 2024-08-22 RX ADMIN — BUPROPION HYDROCHLORIDE 100 MG: 100 TABLET, FILM COATED, EXTENDED RELEASE ORAL at 09:38

## 2024-08-22 RX ADMIN — BUPROPION HYDROCHLORIDE 100 MG: 100 TABLET, FILM COATED, EXTENDED RELEASE ORAL at 21:33

## 2024-08-22 RX ADMIN — GUAIFENESIN SYRUP AND DEXTROMETHORPHAN 5 ML: 100; 10 SYRUP ORAL at 09:46

## 2024-08-22 RX ADMIN — POLYETHYLENE GLYCOL 3350 17 G: 17 POWDER, FOR SOLUTION ORAL at 11:31

## 2024-08-22 RX ADMIN — DOCUSATE SODIUM 100 MG: 100 CAPSULE, LIQUID FILLED ORAL at 09:39

## 2024-08-22 RX ADMIN — SUCRALFATE 1 G: 1 TABLET ORAL at 09:38

## 2024-08-22 ASSESSMENT — PAIN SCALES - GENERAL: PAINLEVEL_OUTOF10: 0

## 2024-08-22 NOTE — PLAN OF CARE
Problem: Discharge Planning  Goal: Discharge to home or other facility with appropriate resources  8/21/2024 2129 by Rufino Richard RN  Outcome: Progressing  8/21/2024 1222 by Katelyn Sol RN  Outcome: Progressing  Flowsheets (Taken 8/20/2024 2252 by Rufino Richard, RN)  Discharge to home or other facility with appropriate resources: Identify barriers to discharge with patient and caregiver     Problem: Skin/Tissue Integrity  Goal: Absence of new skin breakdown  Description: 1.  Monitor for areas of redness and/or skin breakdown  2.  Assess vascular access sites hourly  3.  Every 4-6 hours minimum:  Change oxygen saturation probe site  4.  Every 4-6 hours:  If on nasal continuous positive airway pressure, respiratory therapy assess nares and determine need for appliance change or resting period.  8/21/2024 2129 by Rufino Richard, RN  Outcome: Progressing  8/21/2024 1222 by Katelyn Sol RN  Outcome: Progressing     Problem: Safety - Adult  Goal: Free from fall injury  8/21/2024 2129 by Rufino Richard, RN  Outcome: Progressing  8/21/2024 1222 by Katelyn Sol RN  Outcome: Progressing

## 2024-08-22 NOTE — PROGRESS NOTES
Hospitalist Progress Note      PCP: Sheila Hernandez MD    Date of Admission: 8/16/2024    Chief Complaint:  no acute events, afebrile, stable HD, remains on 2 L. Cough is persistent    Medications:  Reviewed    Infusion Medications    sodium chloride       Scheduled Medications    budesonide  0.5 mg Nebulization BID RT    cefTRIAXone (ROCEPHIN) IV  1,000 mg IntraVENous Q24H    arformoterol tartrate  15 mcg Nebulization BID RT    buPROPion  100 mg Oral BID    dicyclomine  10 mg Oral TID    docusate sodium  100 mg Oral BID    mirtazapine  15 mg Oral Nightly    sucralfate  1 g Oral TID    sodium chloride flush  5-40 mL IntraVENous 2 times per day    enoxaparin  40 mg SubCUTAneous Daily    ipratropium 0.5 mg-albuterol 2.5 mg  1 Dose Inhalation BID RT     PRN Meds: sodium chloride flush, sodium chloride, ondansetron **OR** ondansetron, polyethylene glycol, acetaminophen **OR** acetaminophen, guaiFENesin-dextromethorphan, ipratropium 0.5 mg-albuterol 2.5 mg      Intake/Output Summary (Last 24 hours) at 8/22/2024 1514  Last data filed at 8/22/2024 1230  Gross per 24 hour   Intake 900.93 ml   Output 2600 ml   Net -1699.07 ml       Exam:    /78   Pulse 81   Temp 97.7 °F (36.5 °C) (Oral)   Resp 16   Ht 1.803 m (5' 11\")   Wt 72.6 kg (160 lb)   SpO2 100%   BMI 22.32 kg/m²     General appearance: alert, cooperative  Lungs: diminished with faint wheezing bilaterally  Heart: S1/S2,RRR  Abdomen: soft, active BS  Extremities: no edema         Labs:   No results for input(s): \"WBC\", \"HGB\", \"HCT\", \"PLT\" in the last 72 hours.    No results for input(s): \"NA\", \"K\", \"CL\", \"CO2\", \"BUN\", \"CREATININE\", \"CALCIUM\", \"PHOS\" in the last 72 hours.    Invalid input(s): \"MAGNES\"    No results for input(s): \"AST\", \"ALT\", \"BILIDIR\", \"BILITOT\", \"ALKPHOS\" in the last 72 hours.  No results for input(s): \"INR\" in the last 72 hours.  No results for input(s): \"CKTOTAL\", \"TROPONINI\" in the last 72 hours.    Urinalysis:    No results

## 2024-08-22 NOTE — PROGRESS NOTES
INPATIENT PROGRESS NOTES    PATIENT NAME: Claude Xie  MRN: [id number]  SERVICE DATE:  August 22, 2024   SERVICE TIME:  8:41 AM      PRIMARY SERVICE: Pulmonary Disease    CHIEF COMPLAINTS: COPD exacerbation    INTERVAL HPI: Patient seen and examined at bedside, Interval Notes, orders reviewed. Nursing notes noted    Cough improved, shortness of breath improved, no chest pain, no fever, on 2 L O2 saturation 93%.    New information updated in the note today, rest of the examination did not change compared to yesterday.    Review of system:     GI Abdominal pain No  Skin Rash No    Social History     Tobacco Use    Smoking status: Former     Types: Cigarettes    Smokeless tobacco: Never    Tobacco comments:     Quit 6mo ago   Substance Use Topics    Alcohol use: Yes     Comment: Occassionally     History reviewed. No pertinent family history.      OBJECTIVE    Body mass index is 22.32 kg/m².    PHYSICAL EXAM:  Vitals:  BP (!) 150/83   Pulse 79   Temp 97.5 °F (36.4 °C) (Oral)   Resp 14   Ht 1.803 m (5' 11\")   Wt 72.6 kg (160 lb)   SpO2 93%   BMI 22.32 kg/m²     General: alert, cooperative, no distress  Head: normocephalic, atraumatic  Eyes:No gross abnormalities.  ENT:  MMM no lesions  Neck:  supple and no masses  Chest : clear to auscultation bilaterally- no wheezes, rales or rhonchi, normal air movement, no respiratory distress  Heart:: Heart sounds are normal.  Regular rate and rhythm without murmur, gallop or rub.  ABD:  symmetric, soft, non-tender  Musculoskeletal : no cyanosis, no clubbing, and no edema  Neuro:  Grossly normal  Skin: No rashes or nodules noted.  Lymph node:  no cervical nodes  Urology: No Charles   Psychiatric: appropriate    DATA:   No results for input(s): \"WBC\", \"HGB\", \"HCT\", \"MCV\", \"PLT\" in the last 72 hours.    No results for input(s): \"NA\", \"K\", \"CL\", \"CO2\", \"BUN\", \"CREATININE\", \"GLUCOSE\", \"CALCIUM\", \"LABALBU\", \"BILITOT\", \"ALKPHOS\", \"AST\", \"ALT\", \"LABGLOM\", \"GFRAA\", \"AGRATIO\", \"GLOB\" in  the last 72 hours.    Invalid input(s): \"PROT\"      MV Settings:          No results for input(s): \"PHART\", \"OCT3YXU\", \"PO2ART\", \"EXX8VTF\", \"BEART\", \"S3KSAEOX\" in the last 72 hours.    O2 Device: Nasal cannula  O2 Flow Rate (L/min): 2 L/min    ADULT DIET; Regular     MEDICATIONS during current hospitalization:    Continuous Infusions:   sodium chloride         Scheduled Meds:   budesonide  0.5 mg Nebulization BID RT    cefTRIAXone (ROCEPHIN) IV  1,000 mg IntraVENous Q24H    arformoterol tartrate  15 mcg Nebulization BID RT    buPROPion  100 mg Oral BID    dicyclomine  10 mg Oral TID    docusate sodium  100 mg Oral BID    mirtazapine  15 mg Oral Nightly    sucralfate  1 g Oral TID    sodium chloride flush  5-40 mL IntraVENous 2 times per day    enoxaparin  40 mg SubCUTAneous Daily    ipratropium 0.5 mg-albuterol 2.5 mg  1 Dose Inhalation BID RT       PRN Meds:sodium chloride flush, sodium chloride, ondansetron **OR** ondansetron, polyethylene glycol, acetaminophen **OR** acetaminophen, guaiFENesin-dextromethorphan, ipratropium 0.5 mg-albuterol 2.5 mg    Radiology     CT chest shows emphysema, right upper lobe bronchiectasis and cavitation, with 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

## 2024-08-22 NOTE — PROGRESS NOTES
08/22/24 0700   RT Protocol   History Pulmonary Disease 2   Respiratory pattern 0   Breath sounds 2   Cough 0   Indications for Bronchodilator Therapy Decreased or absent breath sounds   Bronchodilator Assessment Score 4

## 2024-08-22 NOTE — CARE COORDINATION
This LSW visited patient at bedside this am. I notified him that I spoke with Lianne, admissions coordinator at [facility] at 10:15am. Per Lianne patients referral is being reviewed at this time.  LSW/CM to follow.  Electronically signed by ELSA Walter on 8/22/24 at 10:23 AM EDT   This LSW called at 2:45pm and spoke with Lianne admissions coordinator at [facility]. Per Lianne patients referral is still being reviewed. I updated patient and daughter Jordan.  Electronically signed by ELSA Walter on 8/22/24 at 2:49 PM EDT   This LSW sent referral to Anoop Javier admissions coordinator at [facility].  [facility] is patients 2nd SNF choice.  Awaiting a response at this time.  LSW/CM to follow.  Electronically signed by ELSA Walter on 8/22/24 at 4:02 PM EDT

## 2024-08-22 NOTE — PLAN OF CARE
Problem: Discharge Planning  Goal: Discharge to home or other facility with appropriate resources  8/22/2024 1106 by Katelyn Sol RN  Outcome: Progressing  Flowsheets (Taken 8/21/2024 2130 by Rufino Richard, RN)  Discharge to home or other facility with appropriate resources: Identify discharge learning needs (meds, wound care, etc)  8/21/2024 2129 by Rufino Richard, RN  Outcome: Progressing     Problem: Skin/Tissue Integrity  Goal: Absence of new skin breakdown  Description: 1.  Monitor for areas of redness and/or skin breakdown  2.  Assess vascular access sites hourly  3.  Every 4-6 hours minimum:  Change oxygen saturation probe site  4.  Every 4-6 hours:  If on nasal continuous positive airway pressure, respiratory therapy assess nares and determine need for appliance change or resting period.  8/22/2024 1106 by Katelyn Sol RN  Outcome: Progressing  8/21/2024 2129 by Rufino Richard RN  Outcome: Progressing     Problem: Safety - Adult  Goal: Free from fall injury  8/22/2024 1106 by Katelyn Sol RN  Outcome: Progressing  8/21/2024 2129 by Rufino Richard RN  Outcome: Progressing

## 2024-08-22 NOTE — PROGRESS NOTES
Physical Therapy Med Surg Daily Treatment Note  Facility/Department: [address] MED SURG UNIT  Room: [place] [address]       NAME: Claude Xie  : 1950 (74 y.o.)  MRN: [id number]  CODE STATUS: Full Code    Date of Service: 2024    Patient Diagnosis(es): COPD (chronic obstructive pulmonary disease) ([facility]) [J44.9]  Elevated troponin [R79.89]  Chronic obstructive pulmonary disease, unspecified COPD type ([facility]) [J44.9]   Chief Complaint   Patient presents with    Fatigue     Frequent falls     Patient Active Problem List    Diagnosis Date Noted    NSTEMI (non-ST elevated myocardial infarction) ([facility]) 2024    Shortness of breath 2024    Elevated troponin 2024    COPD exacerbation ([facility]) 2024    COPD (chronic obstructive pulmonary disease) ([facility]) 2024        History reviewed. No pertinent past medical history.  History reviewed. No pertinent surgical history.    Chart Reviewed: Yes  Family / Caregiver Present: No    Restrictions:  Restrictions/Precautions: Fall Risk    SUBJECTIVE:   Subjective: \"I am doing alright.\"    Pain  Pain: denies pain    OBJECTIVE:        Bed mobility  Bed Mobility Comments: DNT pt in bedside chair pre/post tx.    Transfers  Sit to Stand: Stand by assistance;Minimal Assistance  Stand to Sit: Stand by assistance;Minimal Assistance  Bed to Chair: Stand by assistance  Comment: improved ability, vc's for sequencing and safety awareness. varying assistance needed. STS x7 throughout tx.    Ambulation  Surface: Level tile  Device: Rolling Walker  Other Apparatus: O2  Assistance: Minimal assistance  Quality of Gait: bilateral knees flexed, crouched posture, NBOS, unsteady  Distance: 10' in room with turn.  Comments: improved stability and use of WW.                              Activity Tolerance  Activity Tolerance: Patient tolerated treatment well          ASSESSMENT   Assessment: improving mobility able to complete increased gait distance with good tolerance.     Discharge

## 2024-08-22 NOTE — PROGRESS NOTES
Pt A&O x4, calm and cooperative. Meds given per MAR, PRN glycolax given. Reg diet tolerated. Skin tear dressing changed. External catheter patent. Pt up to chair with assistance, chair alarm on. Call light within reach, non-slip socks on, bed locked and in low position.     Electronically signed by Katelyn Sol RN on 8/22/2024 at 11:58 AM

## 2024-08-23 VITALS
SYSTOLIC BLOOD PRESSURE: 127 MMHG | RESPIRATION RATE: 16 BRPM | WEIGHT: 160 LBS | TEMPERATURE: 97.5 F | OXYGEN SATURATION: 98 % | HEIGHT: 71 IN | DIASTOLIC BLOOD PRESSURE: 73 MMHG | BODY MASS INDEX: 22.4 KG/M2 | HEART RATE: 86 BPM

## 2024-08-23 PROCEDURE — 99232 SBSQ HOSP IP/OBS MODERATE 35: CPT | Performed by: INTERNAL MEDICINE

## 2024-08-23 PROCEDURE — 94640 AIRWAY INHALATION TREATMENT: CPT

## 2024-08-23 PROCEDURE — 94761 N-INVAS EAR/PLS OXIMETRY MLT: CPT

## 2024-08-23 PROCEDURE — 2700000000 HC OXYGEN THERAPY PER DAY

## 2024-08-23 PROCEDURE — 6360000002 HC RX W HCPCS: Performed by: INTERNAL MEDICINE

## 2024-08-23 PROCEDURE — 2580000003 HC RX 258: Performed by: INTERNAL MEDICINE

## 2024-08-23 PROCEDURE — 6370000000 HC RX 637 (ALT 250 FOR IP): Performed by: INTERNAL MEDICINE

## 2024-08-23 RX ORDER — CEFUROXIME AXETIL 500 MG/1
500 TABLET ORAL 2 TIMES DAILY
DISCHARGE
Start: 2024-08-23 | End: 2024-08-28

## 2024-08-23 RX ORDER — BUDESONIDE 0.5 MG/2ML
0.5 INHALANT ORAL
DISCHARGE
Start: 2024-08-23

## 2024-08-23 RX ADMIN — DICYCLOMINE HYDROCHLORIDE 10 MG: 10 CAPSULE ORAL at 13:58

## 2024-08-23 RX ADMIN — SUCRALFATE 1 G: 1 TABLET ORAL at 13:59

## 2024-08-23 RX ADMIN — Medication 10 ML: at 08:12

## 2024-08-23 RX ADMIN — ARFORMOTEROL TARTRATE 15 MCG: 15 SOLUTION RESPIRATORY (INHALATION) at 07:51

## 2024-08-23 RX ADMIN — DOCUSATE SODIUM 100 MG: 100 CAPSULE, LIQUID FILLED ORAL at 08:10

## 2024-08-23 RX ADMIN — ENOXAPARIN SODIUM 40 MG: 100 INJECTION SUBCUTANEOUS at 08:10

## 2024-08-23 RX ADMIN — IPRATROPIUM BROMIDE AND ALBUTEROL SULFATE 1 DOSE: 2.5; .5 SOLUTION RESPIRATORY (INHALATION) at 07:39

## 2024-08-23 RX ADMIN — SUCRALFATE 1 G: 1 TABLET ORAL at 08:10

## 2024-08-23 RX ADMIN — DICYCLOMINE HYDROCHLORIDE 10 MG: 10 CAPSULE ORAL at 08:10

## 2024-08-23 RX ADMIN — BUDESONIDE INHALATION 500 MCG: 0.5 SUSPENSION RESPIRATORY (INHALATION) at 07:39

## 2024-08-23 RX ADMIN — BUPROPION HYDROCHLORIDE 100 MG: 100 TABLET, FILM COATED, EXTENDED RELEASE ORAL at 08:10

## 2024-08-23 NOTE — CARE COORDINATION
This LSW received notification that patients referral has been accepted at [facility] in [facility].  I arranged transportation via [facility]. Transport is scheduled for 1:00pm.  Patient, patients daughter, Lianne Ortega at [facility], [facility] are all aware.  [id number] completed.  Electronically signed by ELSA Walter on 8/23/24 at 10:35 AM EDT

## 2024-08-23 NOTE — DISCHARGE SUMMARY
Hospital Medicine Discharge Summary    Claude Xie  :  1950  MRN:  [id number]    Admit date:  2024  Discharge date:  2024    Admitting Physician:  Tomas Lee MD  Primary Care Physician:  Sheila Hernandez MD      Chief Complaint   Patient presents with    Fatigue     Frequent falls     Hospital Course:       74 y.o. male with PMH of severe COPD,Mycobacterium Xenopi cavitary lung disease,  NPH s/p  shunt, dementia, depression whop resented with weakness, multiple falls, and worsening of his baseline cough. He was hypoxic on presentation and treated with steroids and duonebs with some improvement. Lower respiratory cx grew Haemophilus influenzae for which he was started on rocephin and will complete another 5d of Ceftin at discharge per ID recommendations. He can follow up closely with his pulmonologist at [facility] after discharge. He was discharged to SNF in stable condition requiring 2 L O2 which can be weaned further as tolerated at his SNF.     Exam on discharge:   /70   Pulse 91   Temp 97.7 °F (36.5 °C) (Oral)   Resp 18   Ht 1.803 m (5' 11\")   Wt 72.6 kg (160 lb)   SpO2 95%   BMI 22.32 kg/m²   General appearance: No apparent distress, appears stated age and cooperative.  HEENT: Pupils equal, round, and reactive to light. Conjunctivae/corneas clear.  Neck: Supple, with full range of motion. No jugular venous distention. Trachea midline.  Respiratory:  Normal respiratory effort. Clear to auscultation, bilaterally without Rales/Wheezes/Rhonchi.  Cardiovascular: Regular rate and rhythm with normal S1/S2 without murmurs, rubs or gallops.  Abdomen: Soft, non-tender, non-distended with normal bowel sounds.  Musculoskeletal: No clubbing, cyanosis or edema bilaterally.  Full range of motion without deformity.  Skin: Skin color, texture, turgor normal.  No rashes or lesions.  Neuro: Non Focal. Symetrical motor and tone. Nl Comprehension, Alert,awake and oriented. NL CN. Symetrical tone  reticular prominence in both lungs.  No evidence of pleural fluid or pneumonia.  Heart is normal in size.  A linear tubular device projects over the right mediastinum into the lower right neck which represents the  shunt tube.  Visualized portions of the tube appear intact.     1. COPD. 2. Bilateral reticular prominence in both lungs. 3. No evidence of pleural fluid or pneumonia.       Discharge Medications:         Medication List        START taking these medications      budesonide 0.5 MG/2ML nebulizer suspension  Commonly known as: PULMICORT  Take 2 mLs by nebulization in the morning and 2 mLs in the evening.     cefUROXime 500 MG tablet  Commonly known as: CEFTIN  Take 1 tablet by mouth 2 times daily for 5 days     guaiFENesin-dextromethorphan 100-10 MG/5ML syrup  Commonly known as: ROBITUSSIN DM  Take 5 mLs by mouth every 4 hours as needed for Cough     ipratropium 0.5 mg-albuterol 2.5 mg 0.5-2.5 (3) MG/3ML Soln nebulizer solution  Commonly known as: DUONEB  Inhale 3 mLs into the lungs every 4 hours as needed for Shortness of Breath            CONTINUE taking these medications      arformoterol tartrate 15 MCG/2ML Nebu  Commonly known as: BROVANA     buPROPion 100 MG extended release tablet  Commonly known as: WELLBUTRIN SR     dicyclomine 10 MG capsule  Commonly known as: BENTYL     docusate 100 MG Caps  Commonly known as: COLACE, DULCOLAX     mirtazapine 15 MG tablet  Commonly known as: REMERON     ONE-A-DAY 50 PLUS PO     sucralfate 1 GM tablet  Commonly known as: CARAFATE            ASK your doctor about these medications      predniSONE 20 MG tablet  Commonly known as: DELTASONE  Take 2 tablets by mouth daily for 1 day  Ask about: Should I take this medication?               Where to Get Your Medications        Information about where to get these medications is not yet available    Ask your nurse or doctor about these medications  budesonide 0.5 MG/2ML nebulizer suspension  cefUROXime 500 MG

## 2024-08-23 NOTE — PROGRESS NOTES
Call received from [facility].  Patient is scheduled for a 1:00pm pickup;  however, they are delayed by 90 minutes.  ALKA Trujillo, aware.  Electronically signed by Jaye Ortega on 8/23/2024 at 12:30 PM

## 2024-08-23 NOTE — PROGRESS NOTES
Patient was off of oxygen when nurse entered room. Spo2 was 85%. Reapplied 2L nc and spo2 came up to 93%.    Electronically signed by Cassandra White RN on 8/23/2024 at 9:10 AM

## 2024-08-23 NOTE — PROGRESS NOTES
Pt assessment completed. Pt is A&Ox4, calm, and cooperative. Pt denies having any pain at this time. Medications given per MAR. Skin tear on Left arm is currently bandaged. Pt denies any other needs or concerns. Call light within reach, bed locked, bed in lowest position.

## 2024-08-23 NOTE — PROGRESS NOTES
Report called to Jaci at [facility].     Electronically signed by Cassandra White RN on 8/23/2024 at 1:53 PM

## 2024-08-23 NOTE — PROGRESS NOTES
Call received from [facility].  They have outsourced this transport job to [facility].  New pickup time is 1900.  ALKA Trujillo, notified.  Electronically signed by Jaye Ortega on 8/23/2024 at 4:00 PM

## 2024-08-23 NOTE — PROGRESS NOTES
Cardiology progress note    Patient Name: Claude Xie  Admit Date: 2024 12:34 PM  MR #: [id number]  : 1950    Attending Physician: Andrew Camargo MD  Reason for consult: Elevated troponins    History of Presenting Illness:      Claude Xie is a 74 y.o. male on hospital day 7 with a history of COPD, lung disease apparent the Mycobacterium infection which seems chronic, NPH status post  shunt, dementia, hypertension, hyperlipidemia, who was admitted to the hospital after sustaining a fall. History Obtained From:  patient, electronic medical record    In the ER patient was found hypoxic  CT of the chest consistent with inflammatory, infectious process, severe emphysema  Troponins elevated at 100 trended down to 87  EKG sinus rhythm without obvious signs of ischemia  ==============  Hospital course  2024  Patient remained stable  Per nursing staff patient was accepted at [facility] in [facility] patient stable to be discharged at any time    2024  Patient sitting in bed looks comfortable  Denies chest pain  Shortness of breath improving  On nasal cannula  Plan to send patient to SNF later on today as per nursing staff    2024  Patient sitting in bed looks comfortable  On nasal cannula  Denies chest pain  Reports that shortness of breath is improving  Awaiting placement    2024  Patient sitting denies chest pain, shortness of breath improving  Telemetry sinus rhythm    2024  Patient laying in bed comfortably  Denies chest pain  Denies shortness of breath, patient still coughing, productive cough  Telemetry sinus rhythm    2024  Patient laying in bed looks comfortable  Denies chest pain  Still having productive cough  Daughter and son-in-law at bedside    History:      History reviewed. No pertinent past medical history.  History reviewed. No pertinent surgical history.  Family History  History reviewed. No pertinent family history.  [] Unable to obtain due to      Active Member of Clubs or Organizations: Yes     Attends Club or Organization Meetings: 1 to 4 times per year     Marital Status:    Intimate Partner Violence: Not on file   Housing Stability: Low Risk  (8/16/2024)    Housing Stability Vital Sign     Unable to Pay for Housing in the Last Year: No     Number of Times Moved in the Last Year: 1     Homeless in the Last Year: No      [] Unable to obtain due to ventilated and/ or neurologic status    Home Medications:      Medications Prior to Admission: sucralfate (CARAFATE) 1 GM tablet, Take 1 tablet by mouth 3 times daily  Multiple Vitamins-Minerals (ONE-A-DAY 50 PLUS PO), Take 1 tablet by mouth daily (with breakfast)  mirtazapine (REMERON) 15 MG tablet, Take 1 tablet by mouth nightly  docusate (COLACE, DULCOLAX) 100 MG CAPS, Take 100 mg by mouth 2 times daily  dicyclomine (BENTYL) 10 MG capsule, Take 1 capsule by mouth 3 times daily  arformoterol tartrate (BROVANA) 15 MCG/2ML NEBU, Inhale 2 mLs into the lungs in the morning and 2 mLs in the evening.  buPROPion (WELLBUTRIN SR) 100 MG extended release tablet, Take 1 tablet by mouth 2 times daily  [DISCONTINUED] sodium chloride, Inhalant, 3 % nebulizer solution, Inhale 4 mLs into the lungs 2 times daily  [DISCONTINUED] memantine (NAMENDA) 10 MG tablet, Take 1 tablet by mouth daily  Take 1/2 tablet daily x 7 days,then 1/2 tablet 2x daily x 7 days, then 1/2 tab in AM & 1tab @ dinner x7 days then 1 tab 2x daily thereafter    Instructions: Take 1/2 tablet daily x 7 days,then 1/2 tablet 2x daily x 7 days, then 1/2 tab in AM & 1tab @ dinner x7 days then 1 tab 2x daily thereafter    Current Hospital Medications:   Scheduled Meds:   budesonide  0.5 mg Nebulization BID RT    cefTRIAXone (ROCEPHIN) IV  1,000 mg IntraVENous Q24H    arformoterol tartrate  15 mcg Nebulization BID RT    buPROPion  100 mg Oral BID    dicyclomine  10 mg Oral TID    docusate sodium  100 mg Oral BID    mirtazapine  15 mg Oral Nightly

## 2024-08-23 NOTE — PROGRESS NOTES
INPATIENT PROGRESS NOTES    PATIENT NAME: Claude Xie  MRN: [id number]  SERVICE DATE:  August 23, 2024   SERVICE TIME:  12:57 PM      PRIMARY SERVICE: Pulmonary Disease    CHIEF COMPLAIN: COPD exacerbation      INTERVAL HPI: Patient seen and examined at bedside, Interval Notes, orders reviewed. Nursing notes noted  Patient is feeling better currently on 2 L O2 via nasal cannula O2 saturation 98%   he is going to SNF  He has been following With pulmonologist at [facility] and he is going to continue to follow them.   He is having cough with some mucus less than before .  He denies having any chest pain pleuritic pain.  No fever or chills.  No nausea vomiting diarrhea.         OBJECTIVE   I/O:24HR INTAKE/OUTPUT:    Intake/Output Summary (Last 24 hours) at 8/23/2024 1257  Last data filed at 8/23/2024 1129  Gross per 24 hour   Intake 250 ml   Output 2350 ml   Net -2100 ml     08/22 0701 - 08/23 0700  In: 720 [P.O.:720]  Out: 1750 [Urine:1750]  Body mass index is 22.32 kg/m².    PHYSICAL EXAM:  Vitals:  BP (!) 145/68   Pulse 82   Temp 97.5 °F (36.4 °C)   Resp 18   Ht 1.803 m (5' 11\")   Wt 72.6 kg (160 lb)   SpO2 98%   BMI 22.32 kg/m²     General: Alert, awake .comfortable in bed, No distress.  Head: Atraumatic , Normocephalic   Eyes: PERRL. No sclera icterus. No conjunctival injection. No discharge   ENT: No nasal  discharge. Pharynx clear.  Neck:  Trachea midline. No thyromegaly, no JVD, No cervical adenopathy.  Chest : Bilaterally symmetrical ,Normal effort,  No accessory muscle use  Lung : Diminished breath sound bilaterally No Rales. No wheezing. No rhonchi.   Heart:: Normal rate. Regular rhythm. No mumur ,  Rub or gallop  ABD: Non-tender. Non-distended. No masses. No organmegaly. Normal bowel sounds. No hernia.  Ext : No Pitting both leg , No Cyanosis No clubbing  Neuro: no focal weakness    Labs:  No results for input(s): \"WBC\", \"HGB\", \"PLT\" in the last 72 hours.    No results for input(s): \"NA\", \"K\", \"CL\", \"CO2\",  of the mA/kV was utilized to reduce the radiation dose to as low as reasonably achievable. COMPARISON: None HISTORY: ORDERING SYSTEM PROVIDED HISTORY: history of cavitary lung disease TECHNOLOGIST PROVIDED HISTORY: Reason for exam:->history of cavitary lung disease What reading provider will be dictating this exam?->CRC FINDINGS: Mediastinum: The heart is normal in size.  Mild calcified coronary atherosclerosis.  Minimal atherosclerosis is seen in the aorta.  No aneurysm. Probable pulmonary arterial hypertension, with dilation of the main pulmonary artery (normal caliber less than 3 cm) to 3.3 cm.  No lymphadenopathy Lungs/pleura: Severe emphysematous changes in the lungs.  Right upper lobe scarring with associated traction bronchiectasis.  There is a partially collapsed cavitary lesion in the posterior aspect of the right upper lobe. It measures approximately 6.4 x 5.4 x 3.4 cm.  The margins of the collapsed cavity is nodular.  Similar findings were observed on the CT of the cervical spine from 10/13/2022, when the cavitary lesion was only partially included in the field view of the study.  Small peripheral pulmonary opacities in the lower lobes may represent atelectasis or scarring. Upper Abdomen: No acute abnormalities in the visualized upper abdomen.  Small calcified granuloma in the spleen. Soft Tissues/Bones: There are age indeterminate compression fracture deformities in the T1, T2 T6 and T11 vertebral bodies.     1. Partially collapsed cavitary lesion in the right upper lobe measuring 6.4 x 5.4 x 3.4 cm. The margins of the collapsed cavity are nodular. Similar findings were observed on the CT of the cervical spine from 10/13/2022, when the cavitary lesion was only partially included in the field view of the study.  An infectious/inflammatory etiology is therefore favored over malignancy.  However, as the study did not fully include the cavity, further evaluation with PET scan may be considered. 2. Severe

## 2024-08-25 NOTE — PROGRESS NOTES
Physical Therapy  Facility/Department: [facility] MED SURG W489/W489-01  Physical Therapy Discharge      NAME: Claude Xie    : 1950 (74 y.o.)  MRN: [id number]    Account: [id number]  Gender: male      Patient has been discharged from acute care hospital. DC patient from current PT program.      Electronically signed by Anita Collazo PT on 24 at 1:52 PM EDT

## 2024-09-03 NOTE — PROGRESS NOTES
Physician Progress Note      PATIENT:               KYREE HARRIS  CSN #:                  [id number]  :                       1950  ADMIT DATE:       2024 12:34 PM  DISCH DATE:        2024 7:35 PM  RESPONDING  PROVIDER #:        Andrew Camargo MD          QUERY TEXT:    Pt admitted with COPD exacerbation.  Pt noted to have pneumonia. If possible,   please document in the progress notes and discharge summary if you are   evaluating and/or treating any of the following:    Note: CAP and HCAP indicate where the pneumonia was acquired, not a specific   type.    The medical record reflects the following:  Risk Factors: COPD, acute respiratory failure, emphsyema  Clinical Indicators: Per Dr. Rooney\"ossible demand ischemia in the setting of   ongoing COPD exacerbation/pneumonia, possible underlying CAD\" Per Dr. Rodriguez   \"Haemophilus influenza pneumonia\"  Treatment: Tylenol, Ancef, Vancomycin    Rianna VALDIVIA, RN, CDS  [phone]  Options provided:  -- Haemophilus pneumonia ruled in POA  -- Haemophilus pneumonia ruled in not  POA  -- Other - I will add my own diagnosis  -- Disagree - Not applicable / Not valid  -- Disagree - Clinically unable to determine / Unknown  -- Refer to Clinical Documentation Reviewer    PROVIDER RESPONSE TEXT:    The patient has Haemiphilus pneumonia ruled in POA    Query created by: Rianna Goodrich on 2024 1:35 PM      Electronically signed by:  Andrew Camargo MD 9/3/2024 5:56 PM

## 2024-09-18 PROBLEM — R79.89 ELEVATED TROPONIN: Status: RESOLVED | Noted: 2024-08-19 | Resolved: 2024-09-18

## 2025-08-19 RX ORDER — SODIUM CHLORIDE FOR INHALATION 3 %
4 VIAL, NEBULIZER (ML) INHALATION 2 TIMES DAILY PRN
COMMUNITY

## 2025-08-19 RX ORDER — TAMSULOSIN HYDROCHLORIDE 0.4 MG/1
0.4 CAPSULE ORAL DAILY
COMMUNITY

## 2025-08-19 RX ORDER — ACETAMINOPHEN 500 MG
1000 TABLET ORAL EVERY 8 HOURS PRN
COMMUNITY

## 2025-08-19 RX ORDER — ALBUTEROL SULFATE 90 UG/1
1 INHALANT RESPIRATORY (INHALATION) EVERY 4 HOURS PRN
COMMUNITY